# Patient Record
Sex: FEMALE | Race: WHITE | NOT HISPANIC OR LATINO | Employment: UNEMPLOYED | URBAN - NONMETROPOLITAN AREA
[De-identification: names, ages, dates, MRNs, and addresses within clinical notes are randomized per-mention and may not be internally consistent; named-entity substitution may affect disease eponyms.]

---

## 2022-01-18 ENCOUNTER — OFFICE VISIT (OUTPATIENT)
Dept: OBSTETRICS AND GYNECOLOGY | Facility: CLINIC | Age: 32
End: 2022-01-18

## 2022-01-18 ENCOUNTER — LAB (OUTPATIENT)
Dept: LAB | Facility: HOSPITAL | Age: 32
End: 2022-01-18

## 2022-01-18 VITALS — WEIGHT: 215.6 LBS | SYSTOLIC BLOOD PRESSURE: 126 MMHG | DIASTOLIC BLOOD PRESSURE: 72 MMHG

## 2022-01-18 DIAGNOSIS — E28.2 PCOS (POLYCYSTIC OVARIAN SYNDROME): ICD-10-CM

## 2022-01-18 DIAGNOSIS — N93.9 ABNORMAL UTERINE BLEEDING (AUB): ICD-10-CM

## 2022-01-18 DIAGNOSIS — N93.9 ABNORMAL UTERINE BLEEDING (AUB): Primary | ICD-10-CM

## 2022-01-18 PROCEDURE — 83036 HEMOGLOBIN GLYCOSYLATED A1C: CPT

## 2022-01-18 PROCEDURE — 84403 ASSAY OF TOTAL TESTOSTERONE: CPT | Performed by: OBSTETRICS & GYNECOLOGY

## 2022-01-18 PROCEDURE — 84480 ASSAY TRIIODOTHYRONINE (T3): CPT

## 2022-01-18 PROCEDURE — 82627 DEHYDROEPIANDROSTERONE: CPT

## 2022-01-18 PROCEDURE — 86800 THYROGLOBULIN ANTIBODY: CPT

## 2022-01-18 PROCEDURE — 36415 COLL VENOUS BLD VENIPUNCTURE: CPT

## 2022-01-18 PROCEDURE — 84146 ASSAY OF PROLACTIN: CPT

## 2022-01-18 PROCEDURE — 84443 ASSAY THYROID STIM HORMONE: CPT | Performed by: OBSTETRICS & GYNECOLOGY

## 2022-01-18 PROCEDURE — 83525 ASSAY OF INSULIN: CPT

## 2022-01-18 PROCEDURE — 86376 MICROSOMAL ANTIBODY EACH: CPT

## 2022-01-18 PROCEDURE — 84402 ASSAY OF FREE TESTOSTERONE: CPT | Performed by: OBSTETRICS & GYNECOLOGY

## 2022-01-18 PROCEDURE — 84439 ASSAY OF FREE THYROXINE: CPT | Performed by: OBSTETRICS & GYNECOLOGY

## 2022-01-18 PROCEDURE — 85027 COMPLETE CBC AUTOMATED: CPT

## 2022-01-18 PROCEDURE — 99204 OFFICE O/P NEW MOD 45 MIN: CPT | Performed by: OBSTETRICS & GYNECOLOGY

## 2022-01-18 RX ORDER — SERTRALINE HYDROCHLORIDE 100 MG/1
100 TABLET, FILM COATED ORAL DAILY
COMMUNITY
End: 2022-10-18

## 2022-01-19 LAB
DEPRECATED RDW RBC AUTO: 41.7 FL (ref 37–54)
ERYTHROCYTE [DISTWIDTH] IN BLOOD BY AUTOMATED COUNT: 13.1 % (ref 12.3–15.4)
HBA1C MFR BLD: 5.48 % (ref 4.8–5.6)
HCT VFR BLD AUTO: 41.3 % (ref 34–46.6)
HGB BLD-MCNC: 14 G/DL (ref 12–15.9)
MCH RBC QN AUTO: 29.6 PG (ref 26.6–33)
MCHC RBC AUTO-ENTMCNC: 33.9 G/DL (ref 31.5–35.7)
MCV RBC AUTO: 87.3 FL (ref 79–97)
PLATELET # BLD AUTO: 338 10*3/MM3 (ref 140–450)
PMV BLD AUTO: 9.9 FL (ref 6–12)
PROLACTIN SERPL-MCNC: 4.72 NG/ML (ref 4.79–23.3)
RBC # BLD AUTO: 4.73 10*6/MM3 (ref 3.77–5.28)
T3 SERPL-MCNC: 122 NG/DL (ref 80–200)
T4 FREE SERPL-MCNC: 1 NG/DL (ref 0.93–1.7)
TSH SERPL DL<=0.05 MIU/L-ACNC: 0.47 UIU/ML (ref 0.27–4.2)
WBC NRBC COR # BLD: 8.34 10*3/MM3 (ref 3.4–10.8)

## 2022-01-19 NOTE — PROGRESS NOTES
"[facility]  Gynecology  Date of Service: 2022    CC: \"I have PCOS and I want a hysterectomy\"    SEYMOUR Joyce is a 31 y.o.  premenopausal female who presents with complaints of PCOS and \"to discuss the next steps for a hysterectomy.\"    She states that she has a longstanding history of PCOS, irregular periods, and elevated testosterone.  She states that she has been on metformin but not currently.  She is currently taking OTC ashwagandha supplements.  She states that she has not had a period for 6 months.  Prior to that, she had irregular periods.  She states that she has had several ultrasounds and was told that \"her PCOS wasn't as bad on the repeat ultrasound.\"  She states that her doctor in [place] \"used her as a guinea pig\" with several medications to help her achieve pregnancy and lose weight, which was successful (she did achieve 2 pregnancies).      She does also describe postcoital bleeding which occurred after having intercourse on Friday.  She feels that it is slowing down but \"her vagina is raw from how many times that she had to remove her menstrual cup.\"    She is very concerned about her hormones and is involved with several groups on social media regarding her hormones and PCOS.  She is tearful and states that this part is affecting her life.  She reports hair loss, facial hair, mood swings, and feeling overwhelmed.  She states that she has had a long discussion with her  and they have decided that they are done having kids and want for her to have a hysterectomy.    ROS  Review of Systems   Constitutional: Negative.    Eyes: Negative.    Respiratory: Negative.    Cardiovascular: Negative.    Gastrointestinal: Positive for abdominal pain and nausea.   Endocrine: Positive for heat intolerance.   Genitourinary: Positive for dyspareunia, frequency, menstrual problem and vaginal bleeding.   Musculoskeletal: Negative.    Skin: Negative.  "   Allergic/Immunologic: Negative.    Neurological: Positive for headaches.   Hematological: Negative.    Psychiatric/Behavioral: Positive for dysphoric mood and sleep disturbance. The patient is nervous/anxious.      GYN HISTORY  Menarche: age 14  Menses: Irregular  History of STIs: None  Last pap smear: , [place] per patient (but no result seen)  Abnormal pap smear history: None per patient  Contraception: None     OB HISTORY  OB History    Para Term  AB Living   2 2 2     2   SAB IAB Ectopic Molar Multiple Live Births             2      # Outcome Date GA Lbr Mauricio/2nd Weight Sex Delivery Anes PTL Lv   2 Term 20 39w1d  2722 g (6 lb) F Vag-Spont EPI  IRLANDA   1 Term 2016 37w0d  2183 g (4 lb 13 oz) M Vag-Spont EPI  IRLANDA     PAST MEDICAL HISTORY  Past Medical History:   Diagnosis Date   • Anxiety    • Migraine    • Mild tetrahydrocannabinol (THC) abuse     uses CBD, hemp   • Polycystic ovary syndrome    • Tobacco abuse     electronic cigarette     PAST SURGICAL HISTORY  Past Surgical History:   Procedure Laterality Date   • LAPAROSCOPIC CHOLECYSTECTOMY       FAMILY HISTORY  Family History   Problem Relation Age of Onset   • No Known Problems Father    • No Known Problems Mother    • Colon cancer Neg Hx    • Uterine cancer Neg Hx    • Breast cancer Neg Hx    • Ovarian cancer Neg Hx      SOCIAL HISTORY  Social History     Socioeconomic History   • Marital status:    Tobacco Use   • Smoking status: Current Every Day Smoker     Packs/day: 0.00     Years: 1.00     Pack years: 0.00     Types: Electronic Cigarette   • Smokeless tobacco: Never Used   Vaping Use   • Vaping Use: Every day   • Substances: CBD   Substance and Sexual Activity   • Alcohol use: Never   • Drug use: Never     Types: Other   • Sexual activity: Yes     Partners: Male     Birth control/protection: None     Comment: My  of 7 years     ALLERGIES  No Known Allergies    HOME MEDICATIONS  Prior to Admission  medications    Medication Sig Start Date End Date Taking? Authorizing Provider   ASHWAGANDHA PO Take  by mouth.   Yes ProviderKenny MD   sertraline (ZOLOFT) 100 MG tablet Take 100 mg by mouth Daily.   Yes Provider, MD Kenny     PE  /72   Wt 97.8 kg (215 lb 9.6 oz)        General: Alert, healthy, no distress, well nourished and well developed.  Neurologic: Alert, oriented to person, place, and time.  Gait normal.  Cranial nerves II-XII grossly intact.  HEENT: Normocephalic, atraumatic.  Extraocular muscles intact, pupils equal and reactive times two.    Neck: Supple, no adenopathy, thyroid normal size, non-tender, without nodularity, trachea midline.  Lungs: Normal respiratory effort.  Clear to auscultation bilaterally.  No wheezes, rhonci, or rales.  Heart: Regular rate and rhythm.  No murmer, rub or gallop.  Abdomen: Soft, obese, non-tender, non-distended,no masses, no hepatosplenomegaly, no hernia.  Skin: No rash, no lesions.  Extremities: No cyanosis, clubbing or edema.  PELVIC EXAM: Deferred to EMB/pap smear    IMPRESSION  Daphne Joyce is a 31 y.o.  presenting with AUB, suspect AUB-O, with PCOS.    PLAN    1. Abnormal uterine bleeding (AUB)  Will obtain labs and RTC for US/EMB.  Following these results, will discuss if hysterectomy is appropriate next step.  - CBC (No Diff); Future  - TSH+Free T4  - T3; Future  - US Non-ob Transvaginal; Future    2. PCOS (polycystic ovarian syndrome)  I discussed that a hysterectomy will not resolve the underlying issue of PCOS which is not ovarian cysts but metabolic syndrome.  I discussed that this ultimately involves improving insulin resistance, weight loss, exercise, and controlling co-morbidities.  Will evaluate hormones.  - Thyroid Antibodies; Future  - Insulin, Total; Future  - DHEA-Sulfate; Future  - Testosterone, Free, Total  - Hemoglobin A1c; Future    This document has been electronically signed by Hillary Nation MD on 2022  21:50 CST.

## 2022-01-20 LAB
DHEA-S SERPL-MCNC: 186 UG/DL (ref 84.8–378)
INSULIN SERPL-ACNC: 12.9 UIU/ML (ref 2.6–24.9)
THYROGLOB AB SERPL-ACNC: <1 IU/ML (ref 0–0.9)
THYROPEROXIDASE AB SERPL-ACNC: 12 IU/ML (ref 0–34)

## 2022-01-22 LAB
TESTOST FREE SERPL-MCNC: 2.7 PG/ML (ref 0–4.2)
TESTOST SERPL-MCNC: 42 NG/DL (ref 13–71)

## 2022-10-18 ENCOUNTER — OFFICE VISIT (OUTPATIENT)
Dept: FAMILY MEDICINE CLINIC | Facility: CLINIC | Age: 32
End: 2022-10-18

## 2022-10-18 VITALS
SYSTOLIC BLOOD PRESSURE: 124 MMHG | HEART RATE: 118 BPM | WEIGHT: 217.4 LBS | RESPIRATION RATE: 22 BRPM | HEIGHT: 63 IN | BODY MASS INDEX: 38.52 KG/M2 | DIASTOLIC BLOOD PRESSURE: 68 MMHG | OXYGEN SATURATION: 98 %

## 2022-10-18 DIAGNOSIS — F41.9 ANXIETY: ICD-10-CM

## 2022-10-18 DIAGNOSIS — Z76.89 ENCOUNTER TO ESTABLISH CARE: Primary | ICD-10-CM

## 2022-10-18 PROCEDURE — 99204 OFFICE O/P NEW MOD 45 MIN: CPT | Performed by: NURSE PRACTITIONER

## 2022-10-18 NOTE — PROGRESS NOTES
"Chief Complaint  Establish Care    Subjective          Daphne Joyce presents to [facility] [place]  To establish care. She is having issues with anxiety and would like to get started on something.       Anxiety  Presents for initial visit. Onset was at an unknown time. The problem has been waxing and waning. Symptoms include excessive worry and nervous/anxious behavior. Symptoms occur occasionally. The severity of symptoms is mild.     Her past medical history is significant for anxiety/panic attacks. Past treatments include SSRIs and non-SSRI antidepressants. The treatment provided moderate relief. Compliance with prior treatments has been good.     Outpatient Medications Prior to Visit   Medication Sig Dispense Refill   • ASHWAGANDHA PO Take  by mouth.     • sertraline (ZOLOFT) 100 MG tablet Take 100 mg by mouth Daily.       No facility-administered medications prior to visit.       Review of Systems   Psychiatric/Behavioral: The patient is nervous/anxious.          Objective   Vital Signs:   Visit Vitals  /68 (BP Location: Left arm, Patient Position: Sitting, Cuff Size: Large Adult)   Pulse 118   Resp 22   Ht 160 cm (63\")   Wt 98.6 kg (217 lb 6.4 oz)   SpO2 98%   BMI 38.51 kg/m²     Physical Exam  Vitals and nursing note reviewed.   Constitutional:       Appearance: She is well-developed.   HENT:      Head: Normocephalic and atraumatic.   Eyes:      General: Lids are normal.      Conjunctiva/sclera: Conjunctivae normal.   Neck:      Thyroid: No thyroid mass or thyromegaly.      Trachea: Trachea normal. No tracheal tenderness.   Cardiovascular:      Rate and Rhythm: Normal rate.      Pulses: Normal pulses.      Heart sounds: Normal heart sounds.   Pulmonary:      Effort: Pulmonary effort is normal. No respiratory distress.      Breath sounds: Normal breath sounds. No wheezing.   Abdominal:      General: There is no distension.      Palpations: Abdomen is " soft. There is no mass.   Musculoskeletal:         General: Normal range of motion.      Cervical back: Normal range of motion. No edema.   Skin:     General: Skin is warm and dry.      Coloration: Skin is not pale.      Findings: No abrasion, erythema or lesion.   Neurological:      Mental Status: She is alert and oriented to person, place, and time.   Psychiatric:         Mood and Affect: Mood is anxious. Affect is not inappropriate.         Speech: Speech normal.         Behavior: Behavior normal.         Thought Content: Thought content normal.         Judgment: Judgment normal. Judgment is not impulsive.        Result Review :                 Assessment and Plan    Diagnoses and all orders for this visit:    1. Encounter to establish care (Primary)    2. Anxiety  -     sertraline (Zoloft) 50 MG tablet; Take 1 tablet by mouth Daily.  Dispense: 30 tablet; Refill: 11  -     TSH; Future  -     Comprehensive Metabolic Panel; Future  -     Hemoglobin A1c; Future  -     CBC & Differential; Future  -     Vitamin B12; Future  -     Lipid Panel; Future        Please complete ordered lab work   We will call with results    We will start Zoloft   Please call the office if you have any issues    If you have any thoughts of harming yourself or others please stop medication and go to ER         Follow Up   Return in about 4 weeks (around 11/15/2022), or if symptoms worsen or fail to improve, for Recheck.  Patient was given instructions and counseling regarding her condition or for health maintenance advice. Please see specific information pulled into the AVS if appropriate.           This document has been electronically signed by MANUEL Crawford on October 19, 2022 16:59 CDT

## 2023-01-17 ENCOUNTER — OFFICE VISIT (OUTPATIENT)
Dept: FAMILY MEDICINE CLINIC | Facility: CLINIC | Age: 33
End: 2023-01-17
Payer: COMMERCIAL

## 2023-01-17 DIAGNOSIS — M54.50 ACUTE BILATERAL LOW BACK PAIN WITHOUT SCIATICA: Primary | ICD-10-CM

## 2023-01-17 DIAGNOSIS — F41.9 ANXIETY: ICD-10-CM

## 2023-01-17 PROCEDURE — 99214 OFFICE O/P EST MOD 30 MIN: CPT | Performed by: NURSE PRACTITIONER

## 2023-01-17 RX ORDER — SERTRALINE HYDROCHLORIDE 100 MG/1
100 TABLET, FILM COATED ORAL DAILY
Qty: 30 TABLET | Refills: 11 | Status: SHIPPED | OUTPATIENT
Start: 2023-01-17

## 2023-01-17 RX ORDER — CYCLOBENZAPRINE HCL 10 MG
10 TABLET ORAL 3 TIMES DAILY PRN
Qty: 60 TABLET | Refills: 0 | Status: SHIPPED | OUTPATIENT
Start: 2023-01-17

## 2023-01-17 NOTE — PROGRESS NOTES
"Chief Complaint  Med Refill and Back Pain    Subjective          Daphne Joyce presents to [facility] [place] with complaints of lower back pain that has been going on for about a month. Anxiety has been getting a little worse and would like to increase her zoloft dose.         Back Pain  This is a new problem. The current episode started 1 to 4 weeks ago. The problem occurs daily. The problem is unchanged. The quality of the pain is described as aching. The pain is at a severity of 4/10. The pain is mild. She has tried heat and bed rest for the symptoms. The treatment provided mild relief.   Anxiety  Presents for follow-up visit. Onset was at an unknown time. The problem has been waxing and waning. Symptoms include excessive worry and nervous/anxious behavior. Symptoms occur occasionally. The severity of symptoms is mild.     Her past medical history is significant for anxiety/panic attacks. Past treatments include SSRIs and non-SSRI antidepressants. The treatment provided moderate relief. Compliance with prior treatments has been good.     Outpatient Medications Prior to Visit   Medication Sig Dispense Refill   • sertraline (Zoloft) 50 MG tablet Take 1 tablet by mouth Daily. 30 tablet 11   • ASHWAGANDHA PO Take  by mouth.       No facility-administered medications prior to visit.       Review of Systems   Musculoskeletal: Positive for back pain.   Psychiatric/Behavioral: The patient is nervous/anxious.          Objective   Vital Signs:   Visit Vitals  /88   Pulse 82   Temp 97.6 °F (36.4 °C) (Infrared)   Resp 18   Ht 160 cm (63\")   Wt 28.6 kg (63 lb)   SpO2 98%   BMI 11.16 kg/m²     Physical Exam  Vitals and nursing note reviewed.   Constitutional:       Appearance: She is well-developed.   HENT:      Head: Normocephalic and atraumatic.   Eyes:      General: Lids are normal.      Conjunctiva/sclera: Conjunctivae normal.   Neck:      Thyroid: No thyroid mass or " thyromegaly.      Trachea: Trachea normal. No tracheal tenderness.   Cardiovascular:      Rate and Rhythm: Normal rate.      Pulses: Normal pulses.      Heart sounds: Normal heart sounds.   Pulmonary:      Effort: Pulmonary effort is normal. No respiratory distress.      Breath sounds: Normal breath sounds. No wheezing.   Abdominal:      General: There is no distension.      Palpations: Abdomen is soft. There is no mass.   Musculoskeletal:         General: Normal range of motion.      Cervical back: Normal range of motion. No edema.      Lumbar back: Spasms and tenderness present.   Skin:     General: Skin is warm and dry.      Coloration: Skin is not pale.      Findings: No abrasion, erythema or lesion.   Neurological:      Mental Status: She is alert and oriented to person, place, and time.   Psychiatric:         Mood and Affect: Mood is not anxious. Affect is not inappropriate.         Speech: Speech normal.         Behavior: Behavior normal.         Thought Content: Thought content normal.         Judgment: Judgment normal. Judgment is not impulsive.        Result Review :                 Assessment and Plan    Diagnoses and all orders for this visit:    1. Acute bilateral low back pain without sciatica (Primary)  -     cyclobenzaprine (FLEXERIL) 10 MG tablet; Take 1 tablet by mouth 3 (Three) Times a Day As Needed for Muscle Spasms.  Dispense: 60 tablet; Refill: 0  -     XR Spine Lumbar 2 or 3 View; Future    2. Anxiety  -     sertraline (Zoloft) 100 MG tablet; Take 1 tablet by mouth Daily.  Dispense: 30 tablet; Refill: 11        X-ray of lower back today  We will call with results  Start prescribed medications May use 3 times a day  May use heat on lower back to help with spasms    Start Zoloft 100 mg daily  If you have any thoughts of harming yourself or others on this medication please stop and go to the ER    Please call the office if you have any issues        Follow Up   Return if symptoms worsen or fail  to improve, for Next scheduled follow up.  Patient was given instructions and counseling regarding her condition or for health maintenance advice. Please see specific information pulled into the AVS if appropriate.           This document has been electronically signed by MANUEL Crawford on January 19, 2023 07:19 CST  Answers for HPI/ROS submitted by the patient on 1/17/2023  Please describe your symptoms.: Aching lower back pain. Also would like to discuss increasing my anxiety medication.  Have you had these symptoms before?: Yes  How long have you been having these symptoms?: Greater than 2 weeks  What is the primary reason for your visit?: Other

## 2023-06-06 ENCOUNTER — INITIAL PRENATAL (OUTPATIENT)
Dept: OBSTETRICS AND GYNECOLOGY | Facility: CLINIC | Age: 33
End: 2023-06-06
Payer: COMMERCIAL

## 2023-06-06 ENCOUNTER — LAB (OUTPATIENT)
Dept: LAB | Facility: HOSPITAL | Age: 33
End: 2023-06-06
Payer: COMMERCIAL

## 2023-06-06 VITALS — WEIGHT: 201 LBS | SYSTOLIC BLOOD PRESSURE: 102 MMHG | DIASTOLIC BLOOD PRESSURE: 62 MMHG | BODY MASS INDEX: 35.61 KG/M2

## 2023-06-06 DIAGNOSIS — Z32.01 PREGNANCY EXAMINATION OR TEST, POSITIVE RESULT: Primary | ICD-10-CM

## 2023-06-06 DIAGNOSIS — Z32.00 PREGNANCY EXAMINATION OR TEST, PREGNANCY UNCONFIRMED: ICD-10-CM

## 2023-06-06 PROBLEM — F12.10 MILD TETRAHYDROCANNABINOL (THC) ABUSE: Status: ACTIVE | Noted: 2023-06-06

## 2023-06-06 LAB
ABO GROUP BLD: NORMAL
AMPHET+METHAMPHET UR QL: NEGATIVE
AMPHETAMINES UR QL: NEGATIVE
B-HCG UR QL: POSITIVE
BARBITURATES UR QL SCN: NEGATIVE
BASOPHILS # BLD AUTO: 0.04 10*3/MM3 (ref 0–0.2)
BASOPHILS NFR BLD AUTO: 0.4 % (ref 0–1.5)
BENZODIAZ UR QL SCN: NEGATIVE
BILIRUB UR QL STRIP: NEGATIVE
BLD GP AB SCN SERPL QL: NEGATIVE
BUPRENORPHINE SERPL-MCNC: NEGATIVE NG/ML
CANNABINOIDS SERPL QL: POSITIVE
CLARITY UR: ABNORMAL
COCAINE UR QL: NEGATIVE
COLOR UR: YELLOW
DEPRECATED RDW RBC AUTO: 41.1 FL (ref 37–54)
EOSINOPHIL # BLD AUTO: 0.15 10*3/MM3 (ref 0–0.4)
EOSINOPHIL NFR BLD AUTO: 1.5 % (ref 0.3–6.2)
ERYTHROCYTE [DISTWIDTH] IN BLOOD BY AUTOMATED COUNT: 13 % (ref 12.3–15.4)
EXPIRATION DATE: ABNORMAL
FENTANYL UR-MCNC: NEGATIVE NG/ML
GLUCOSE UR STRIP-MCNC: NEGATIVE MG/DL
HBV SURFACE AG SERPL QL IA: NORMAL
HCT VFR BLD AUTO: 42.9 % (ref 34–46.6)
HCV AB SER DONR QL: NORMAL
HGB BLD-MCNC: 14.5 G/DL (ref 12–15.9)
HGB UR QL STRIP.AUTO: NEGATIVE
HIV1+2 AB SER QL: NORMAL
IMM GRANULOCYTES # BLD AUTO: 0.03 10*3/MM3 (ref 0–0.05)
IMM GRANULOCYTES NFR BLD AUTO: 0.3 % (ref 0–0.5)
INTERNAL NEGATIVE CONTROL: NEGATIVE
INTERNAL POSITIVE CONTROL: POSITIVE
KETONES UR QL STRIP: ABNORMAL
LEUKOCYTE ESTERASE UR QL STRIP.AUTO: ABNORMAL
LYMPHOCYTES # BLD AUTO: 2.05 10*3/MM3 (ref 0.7–3.1)
LYMPHOCYTES NFR BLD AUTO: 19.9 % (ref 19.6–45.3)
Lab: ABNORMAL
Lab: NORMAL
MCH RBC QN AUTO: 29.4 PG (ref 26.6–33)
MCHC RBC AUTO-ENTMCNC: 33.8 G/DL (ref 31.5–35.7)
MCV RBC AUTO: 87 FL (ref 79–97)
METHADONE UR QL SCN: NEGATIVE
MONOCYTES # BLD AUTO: 0.63 10*3/MM3 (ref 0.1–0.9)
MONOCYTES NFR BLD AUTO: 6.1 % (ref 5–12)
NEUTROPHILS NFR BLD AUTO: 7.41 10*3/MM3 (ref 1.7–7)
NEUTROPHILS NFR BLD AUTO: 71.8 % (ref 42.7–76)
NITRITE UR QL STRIP: NEGATIVE
NRBC BLD AUTO-RTO: 0 /100 WBC (ref 0–0.2)
OPIATES UR QL: NEGATIVE
OXYCODONE UR QL SCN: NEGATIVE
PCP UR QL SCN: NEGATIVE
PH UR STRIP.AUTO: 5.5 [PH] (ref 5–8)
PLATELET # BLD AUTO: 305 10*3/MM3 (ref 140–450)
PMV BLD AUTO: 9.9 FL (ref 6–12)
PROPOXYPH UR QL: NEGATIVE
PROT UR QL STRIP: ABNORMAL
RBC # BLD AUTO: 4.93 10*6/MM3 (ref 3.77–5.28)
RH BLD: POSITIVE
SP GR UR STRIP: 1.02 (ref 1–1.03)
TRICYCLICS UR QL SCN: NEGATIVE
UROBILINOGEN UR QL STRIP: ABNORMAL
WBC NRBC COR # BLD: 10.31 10*3/MM3 (ref 3.4–10.8)

## 2023-06-06 PROCEDURE — 87661 TRICHOMONAS VAGINALIS AMPLIF: CPT | Performed by: NURSE PRACTITIONER

## 2023-06-06 PROCEDURE — 80081 OBSTETRIC PANEL INC HIV TSTG: CPT | Performed by: NURSE PRACTITIONER

## 2023-06-06 PROCEDURE — 80307 DRUG TEST PRSMV CHEM ANLYZR: CPT | Performed by: NURSE PRACTITIONER

## 2023-06-06 PROCEDURE — 86803 HEPATITIS C AB TEST: CPT | Performed by: NURSE PRACTITIONER

## 2023-06-06 PROCEDURE — 87591 N.GONORRHOEAE DNA AMP PROB: CPT | Performed by: NURSE PRACTITIONER

## 2023-06-06 PROCEDURE — 87086 URINE CULTURE/COLONY COUNT: CPT | Performed by: NURSE PRACTITIONER

## 2023-06-06 PROCEDURE — 36415 COLL VENOUS BLD VENIPUNCTURE: CPT

## 2023-06-06 PROCEDURE — 87491 CHLMYD TRACH DNA AMP PROBE: CPT | Performed by: NURSE PRACTITIONER

## 2023-06-06 PROCEDURE — 81003 URINALYSIS AUTO W/O SCOPE: CPT | Performed by: NURSE PRACTITIONER

## 2023-06-06 PROCEDURE — 81025 URINE PREGNANCY TEST: CPT | Performed by: NURSE PRACTITIONER

## 2023-06-06 NOTE — PROGRESS NOTES
I spent approximately 60 minutes with the patient acquiring the health and history intake, reviewing prior records, discussing topics related to healthy pregnancy, entering orders, and documentation. Her LMP is unknown. Patient states that her LMP was sometime in April. This is her 3rd pregnancy.   A new OB bag is given. The 1st trimester teaching was done with the patient. We discussed a healthy diet and exercise and what is recommended. I also discussed Listeriosis and Toxoplasmosis and what fish to avoid due to high mercury levels. I informed patient not to be in hot tubs, saunas, or tanning beds. We discussed that spotting may occur after intercourse which is common, but if heavy bleeding like a period occurs to [facility] or hospital if clinic is closed.  I encouraged her to make an appointment with the dentist if she has not had a dental exam and cleaning in the last 6 months.  I instructed the patient that alcohol, illicit drug use, and tobacco smoking are not recommended in pregnancy.  She plans to breastfeed and bottle feed. Patient states that she didn't produce enough milk to only breastfeed with the last baby.  She filled out the health department referral form and depression screening questionnaire. I encouraged the patient to get the TDAP vaccine in the 3rd trimester.  I discussed with the patient that a pediatrician needs to be chosen prior to delivery for the infant to have an appointment scheduled before leaving the hospital. During the patient first pregnancy, she had an abnormal trisomy 18 test result and intermittent absent doppler flow.  The baby had no issues at birth.  During the patients second pregnancy, she had an abnormal trisomy 21 test result.  No issues at birth.  I discussed lab tests will be done today. All questions were answered at this time.  The patient filled out the papers to get her medical records from her first pregnancy.

## 2023-06-07 LAB
BACTERIA SPEC AEROBE CULT: NO GROWTH
C TRACH RRNA CVX QL NAA+PROBE: NEGATIVE
N GONORRHOEA RRNA SPEC QL NAA+PROBE: NEGATIVE
RPR SER QL: NORMAL
RUBV IGG SERPL IA-ACNC: 5.91 INDEX
TRICHOMONAS VAGINALIS PCR: NEGATIVE

## 2023-07-26 ENCOUNTER — ROUTINE PRENATAL (OUTPATIENT)
Dept: OBSTETRICS AND GYNECOLOGY | Facility: CLINIC | Age: 33
End: 2023-07-26
Payer: COMMERCIAL

## 2023-07-26 VITALS — DIASTOLIC BLOOD PRESSURE: 62 MMHG | WEIGHT: 197.8 LBS | SYSTOLIC BLOOD PRESSURE: 118 MMHG | BODY MASS INDEX: 35.04 KG/M2

## 2023-07-26 DIAGNOSIS — O09.292 HISTORY OF INTRAUTERINE GROWTH RESTRICTION IN PRIOR PREGNANCY, CURRENTLY PREGNANT IN SECOND TRIMESTER: ICD-10-CM

## 2023-07-26 DIAGNOSIS — F12.10 MILD TETRAHYDROCANNABINOL (THC) ABUSE: Primary | ICD-10-CM

## 2023-07-26 DIAGNOSIS — O99.210 OBESITY IN PREGNANCY, ANTEPARTUM: ICD-10-CM

## 2023-07-26 DIAGNOSIS — O09.92 HIGH-RISK PREGNANCY IN SECOND TRIMESTER: ICD-10-CM

## 2023-07-26 NOTE — PROGRESS NOTES
CC: Prenatal visit    Daphne Joyce is a 32 y.o.  at 14w1d.  Doing well.  Denies contractions, LOF, or VB.  Not yet feeling regular FM, possible flutters.    BM 1x per week, constipated.  Occasional muscle cramps    /62   Wt 89.7 kg (197 lb 12.8 oz)   LMP  (LMP Unknown)   BMI 35.04 kg/mý   SVE: deferred     Fetal Heart Rate: +vscan      A/P: Daphne Joyce is a 32 y.o.  at 14w1d.  - RTC in 4 weeks  - Discussed increased po hydration, considering magnesium oxide for cramps and constipation, other OTC tx constipation  - Reviewed COVID-19 visitation policy  - Reviewed COVID-19 precautions    Problem List Items Addressed This Visit          Gravid and     History of intrauterine growth restriction in prior pregnancy, currently pregnant in second trimester    Overview     Consider serial growth 3T         High-risk pregnancy in second trimester    Overview     Neg carrier screen  Low risk male         Obesity in pregnancy, antepartum    Overview     Early 1 hr GCT recommended            Mental Health    Mild tetrahydrocannabinol (THC) abuse - Primary    Overview     +THC at nob Ed visit. Not enough urine for cannabinoid confirmation test. Needs urine specimen and this test ordered at first visit with provider.              Diagnosis Plan   1. Mild tetrahydrocannabinol (THC) abuse        2. History of intrauterine growth restriction in prior pregnancy, currently pregnant in second trimester        3. High-risk pregnancy in second trimester        4. Obesity in pregnancy, antepartum          Alecia Toscano DO  2023  15:26 CDT

## 2023-08-08 ENCOUNTER — REFERRAL TRIAGE (OUTPATIENT)
Dept: OBSTETRICS AND GYNECOLOGY | Facility: HOSPITAL | Age: 33
End: 2023-08-08
Payer: COMMERCIAL

## 2023-08-14 ENCOUNTER — PATIENT OUTREACH (OUTPATIENT)
Dept: LABOR AND DELIVERY | Facility: HOSPITAL | Age: 33
End: 2023-08-14
Payer: COMMERCIAL

## 2023-08-14 NOTE — OUTREACH NOTE
[facility]  Enrollment    Current Estimated Gestational Age: 16w6d    Questions/Answers    Flowsheet Row Responses   Would like to participate? No          Patient not interested in program.     Tea Kendall RN  Maternity Nurse Navigator    8/14/2023, 13:58 CDT

## 2023-08-20 PROBLEM — O99.210 OBESITY IN PREGNANCY, ANTEPARTUM: Status: ACTIVE | Noted: 2023-08-20

## 2023-08-20 PROBLEM — O09.92 HIGH-RISK PREGNANCY IN SECOND TRIMESTER: Status: ACTIVE | Noted: 2023-08-20

## 2023-08-20 PROBLEM — O09.292 HISTORY OF INTRAUTERINE GROWTH RESTRICTION IN PRIOR PREGNANCY, CURRENTLY PREGNANT IN SECOND TRIMESTER: Status: ACTIVE | Noted: 2023-08-20

## 2023-08-23 ENCOUNTER — ROUTINE PRENATAL (OUTPATIENT)
Dept: OBSTETRICS AND GYNECOLOGY | Facility: CLINIC | Age: 33
End: 2023-08-23
Payer: COMMERCIAL

## 2023-08-23 ENCOUNTER — LAB (OUTPATIENT)
Dept: LAB | Facility: HOSPITAL | Age: 33
End: 2023-08-23
Payer: COMMERCIAL

## 2023-08-23 VITALS — BODY MASS INDEX: 36.14 KG/M2 | WEIGHT: 204 LBS | DIASTOLIC BLOOD PRESSURE: 64 MMHG | SYSTOLIC BLOOD PRESSURE: 110 MMHG

## 2023-08-23 DIAGNOSIS — O99.210 OBESITY IN PREGNANCY, ANTEPARTUM: ICD-10-CM

## 2023-08-23 DIAGNOSIS — O09.92 HIGH-RISK PREGNANCY IN SECOND TRIMESTER: ICD-10-CM

## 2023-08-23 DIAGNOSIS — F12.10 MILD TETRAHYDROCANNABINOL (THC) ABUSE: ICD-10-CM

## 2023-08-23 DIAGNOSIS — O09.292 HISTORY OF INTRAUTERINE GROWTH RESTRICTION IN PRIOR PREGNANCY, CURRENTLY PREGNANT IN SECOND TRIMESTER: ICD-10-CM

## 2023-08-23 DIAGNOSIS — Z36.3 ENCOUNTER FOR ROUTINE SCREENING FOR FETAL MALFORMATION USING ULTRASOUND: ICD-10-CM

## 2023-08-23 DIAGNOSIS — Z3A.18 18 WEEKS GESTATION OF PREGNANCY: Primary | ICD-10-CM

## 2023-08-23 DIAGNOSIS — O99.211 MATERNAL OBESITY WITHOUT HYPERTENSION, IN FIRST TRIMESTER: ICD-10-CM

## 2023-08-23 LAB — GLUCOSE 1H P 100 G GLC PO SERPL-MCNC: 123 MG/DL (ref 65–139)

## 2023-08-23 PROCEDURE — 99213 OFFICE O/P EST LOW 20 MIN: CPT

## 2023-08-23 PROCEDURE — 82950 GLUCOSE TEST: CPT

## 2023-08-23 PROCEDURE — 36415 COLL VENOUS BLD VENIPUNCTURE: CPT

## 2023-08-23 NOTE — PROGRESS NOTES
CC: Prenatal visit    Daphne Joyce is a 32 y.o.  at 18w1d.  Doing well.  Denies contractions, LOF, or VB.  Reports good FM. Reports having some clear vaginal discharge. Denies any itching, burning or odor.     /64   Wt 92.5 kg (204 lb)   LMP  (LMP Unknown)   BMI 36.14 kg/mý   SVE: na     Fetal Heart Rate: 156    2023 Problems (from 23 to present)       Problem Noted Resolved    History of intrauterine growth restriction in prior pregnancy, currently pregnant in second trimester 2023 by Alecia Toscano DO No    Overview Signed 2023  3:25 PM by Alecia Toscano DO     Consider serial growth 3T         High-risk pregnancy in second trimester 2023 by Alecia Toscano DO No    Overview Signed 2023  3:26 PM by Alecia Toscano DO     Neg carrier screen  Low risk male         Obesity in pregnancy, antepartum 2023 by Alecia Toscano DO No    Overview Signed 2023  3:26 PM by Alecia Toscano DO     Early 1 hr GCT recommended         Mild tetrahydrocannabinol (THC) abuse 2023 by Susan Esposito APRN No    Overview Signed 2023  1:21 PM by Susan Esposito APRN     +THC at nob Ed visit. Not enough urine for cannabinoid confirmation test. Needs urine specimen and this test ordered at first visit with provider.                  A/P: Daphne Joyce is a 32 y.o.  at 18w1d.  - RTC in 2 weeks with anatomy US   - Early glucola completed today.      Diagnosis Plan   1. 18 weeks gestation of pregnancy        2. Mild tetrahydrocannabinol (THC) abuse        3. History of intrauterine growth restriction in prior pregnancy, currently pregnant in second trimester        4. High-risk pregnancy in second trimester        5. Obesity in pregnancy, antepartum        6. Encounter for routine screening for fetal malformation using ultrasound  US Ob 14 + Weeks Single or First Gestation        MANUEL Aldridge  2023  14:01 CDT

## 2023-08-28 DIAGNOSIS — O21.9 NAUSEA AND VOMITING DURING PREGNANCY PRIOR TO 22 WEEKS GESTATION: ICD-10-CM

## 2023-08-28 RX ORDER — ONDANSETRON HYDROCHLORIDE 8 MG/1
8 TABLET, FILM COATED ORAL EVERY 12 HOURS PRN
Qty: 30 TABLET | Refills: 1 | Status: SHIPPED | OUTPATIENT
Start: 2023-08-28

## 2023-09-07 ENCOUNTER — HOSPITAL ENCOUNTER (INPATIENT)
Facility: HOSPITAL | Age: 33
LOS: 1 days | Discharge: HOME | DRG: 779 | End: 2023-09-08
Attending: OBSTETRICS & GYNECOLOGY | Admitting: OBSTETRICS & GYNECOLOGY
Payer: COMMERCIAL

## 2023-09-07 ENCOUNTER — ROUTINE PRENATAL (OUTPATIENT)
Dept: OBSTETRICS AND GYNECOLOGY | Facility: CLINIC | Age: 33
End: 2023-09-07
Payer: COMMERCIAL

## 2023-09-07 VITALS — BODY MASS INDEX: 35.96 KG/M2 | DIASTOLIC BLOOD PRESSURE: 66 MMHG | WEIGHT: 203 LBS | SYSTOLIC BLOOD PRESSURE: 104 MMHG

## 2023-09-07 DIAGNOSIS — O99.210 OBESITY IN PREGNANCY, ANTEPARTUM: ICD-10-CM

## 2023-09-07 DIAGNOSIS — O02.1 FETAL DEMISE BEFORE 20 WEEKS WITH RETENTION OF DEAD FETUS: Primary | ICD-10-CM

## 2023-09-07 DIAGNOSIS — O09.92 HIGH-RISK PREGNANCY IN SECOND TRIMESTER: ICD-10-CM

## 2023-09-07 DIAGNOSIS — F12.10 MILD TETRAHYDROCANNABINOL (THC) ABUSE: ICD-10-CM

## 2023-09-07 DIAGNOSIS — O09.292 HISTORY OF INTRAUTERINE GROWTH RESTRICTION IN PRIOR PREGNANCY, CURRENTLY PREGNANT IN SECOND TRIMESTER: ICD-10-CM

## 2023-09-07 LAB
ABO GROUP BLD: NORMAL
AMPHET+METHAMPHET UR QL: NEGATIVE
AMPHETAMINES UR QL: NEGATIVE
BARBITURATES UR QL SCN: NEGATIVE
BENZODIAZ UR QL SCN: NEGATIVE
BLD GP AB SCN SERPL QL: NEGATIVE
BUPRENORPHINE SERPL-MCNC: NEGATIVE NG/ML
CANNABINOIDS SERPL QL: POSITIVE
COCAINE UR QL: NEGATIVE
DEPRECATED RDW RBC AUTO: 44 FL (ref 37–54)
ERYTHROCYTE [DISTWIDTH] IN BLOOD BY AUTOMATED COUNT: 13.6 % (ref 12.3–15.4)
FENTANYL UR-MCNC: NEGATIVE NG/ML
HBA1C MFR BLD: 4.8 % (ref 4.8–5.6)
HCT VFR BLD AUTO: 40.8 % (ref 34–46.6)
HGB BLD-MCNC: 13.8 G/DL (ref 12–15.9)
HGB F BLD QL KLEIH BETKE: NORMAL
Lab: NORMAL
MCH RBC QN AUTO: 29.9 PG (ref 26.6–33)
MCHC RBC AUTO-ENTMCNC: 33.8 G/DL (ref 31.5–35.7)
MCV RBC AUTO: 88.3 FL (ref 79–97)
METHADONE UR QL SCN: NEGATIVE
OPIATES UR QL: NEGATIVE
OXYCODONE UR QL SCN: NEGATIVE
PCP UR QL SCN: NEGATIVE
PLATELET # BLD AUTO: 311 10*3/MM3 (ref 140–450)
PMV BLD AUTO: 9.1 FL (ref 6–12)
PROPOXYPH UR QL: NEGATIVE
RBC # BLD AUTO: 4.62 10*6/MM3 (ref 3.77–5.28)
RH BLD: POSITIVE
T&S EXPIRATION DATE: NORMAL
TRICYCLICS UR QL SCN: NEGATIVE
TSH SERPL DL<=0.05 MIU/L-ACNC: 1.13 UIU/ML (ref 0.27–4.2)
WBC NRBC COR # BLD: 13.51 10*3/MM3 (ref 3.4–10.8)

## 2023-09-07 PROCEDURE — 85597 PHOSPHOLIPID PLTLT NEUTRALIZ: CPT | Performed by: OBSTETRICS & GYNECOLOGY

## 2023-09-07 PROCEDURE — 0503F POSTPARTUM CARE VISIT: CPT | Performed by: OBSTETRICS & GYNECOLOGY

## 2023-09-07 PROCEDURE — 86901 BLOOD TYPING SEROLOGIC RH(D): CPT | Performed by: OBSTETRICS & GYNECOLOGY

## 2023-09-07 PROCEDURE — 85613 RUSSELL VIPER VENOM DILUTED: CPT | Performed by: OBSTETRICS & GYNECOLOGY

## 2023-09-07 PROCEDURE — 86850 RBC ANTIBODY SCREEN: CPT | Performed by: OBSTETRICS & GYNECOLOGY

## 2023-09-07 PROCEDURE — 85670 THROMBIN TIME PLASMA: CPT | Performed by: OBSTETRICS & GYNECOLOGY

## 2023-09-07 PROCEDURE — 59410 OBSTETRICAL CARE: CPT | Performed by: OBSTETRICS & GYNECOLOGY

## 2023-09-07 PROCEDURE — 86147 CARDIOLIPIN ANTIBODY EA IG: CPT | Performed by: OBSTETRICS & GYNECOLOGY

## 2023-09-07 PROCEDURE — 99024 POSTOP FOLLOW-UP VISIT: CPT

## 2023-09-07 PROCEDURE — 25010000002 MORPHINE PER 10 MG: Performed by: OBSTETRICS & GYNECOLOGY

## 2023-09-07 PROCEDURE — 80307 DRUG TEST PRSMV CHEM ANLYZR: CPT | Performed by: OBSTETRICS & GYNECOLOGY

## 2023-09-07 PROCEDURE — 99222 1ST HOSP IP/OBS MODERATE 55: CPT | Performed by: OBSTETRICS & GYNECOLOGY

## 2023-09-07 PROCEDURE — 85610 PROTHROMBIN TIME: CPT | Performed by: OBSTETRICS & GYNECOLOGY

## 2023-09-07 PROCEDURE — 85732 THROMBOPLASTIN TIME PARTIAL: CPT | Performed by: OBSTETRICS & GYNECOLOGY

## 2023-09-07 PROCEDURE — 85460 HEMOGLOBIN FETAL: CPT | Performed by: OBSTETRICS & GYNECOLOGY

## 2023-09-07 PROCEDURE — G0480 DRUG TEST DEF 1-7 CLASSES: HCPCS | Performed by: OBSTETRICS & GYNECOLOGY

## 2023-09-07 PROCEDURE — 86146 BETA-2 GLYCOPROTEIN ANTIBODY: CPT | Performed by: OBSTETRICS & GYNECOLOGY

## 2023-09-07 PROCEDURE — 86900 BLOOD TYPING SEROLOGIC ABO: CPT | Performed by: OBSTETRICS & GYNECOLOGY

## 2023-09-07 PROCEDURE — 83036 HEMOGLOBIN GLYCOSYLATED A1C: CPT | Performed by: OBSTETRICS & GYNECOLOGY

## 2023-09-07 PROCEDURE — 85730 THROMBOPLASTIN TIME PARTIAL: CPT | Performed by: OBSTETRICS & GYNECOLOGY

## 2023-09-07 PROCEDURE — 85598 HEXAGNAL PHOSPH PLTLT NEUTRL: CPT | Performed by: OBSTETRICS & GYNECOLOGY

## 2023-09-07 PROCEDURE — 88305 TISSUE EXAM BY PATHOLOGIST: CPT

## 2023-09-07 PROCEDURE — 85027 COMPLETE CBC AUTOMATED: CPT | Performed by: OBSTETRICS & GYNECOLOGY

## 2023-09-07 PROCEDURE — 84443 ASSAY THYROID STIM HORMONE: CPT | Performed by: OBSTETRICS & GYNECOLOGY

## 2023-09-07 RX ORDER — LIDOCAINE HYDROCHLORIDE 10 MG/ML
0.5 INJECTION, SOLUTION INFILTRATION; PERINEURAL ONCE AS NEEDED
Status: DISCONTINUED | OUTPATIENT
Start: 2023-09-07 | End: 2023-09-08 | Stop reason: HOSPADM

## 2023-09-07 RX ORDER — FERROUS SULFATE TAB EC 324 MG (65 MG FE EQUIVALENT) 324 (65 FE) MG
324 TABLET DELAYED RESPONSE ORAL 2 TIMES DAILY WITH MEALS
Status: CANCELLED | OUTPATIENT
Start: 2023-09-08

## 2023-09-07 RX ORDER — OXYTOCIN/0.9 % SODIUM CHLORIDE 30/500 ML
250 PLASTIC BAG, INJECTION (ML) INTRAVENOUS CONTINUOUS
Status: ACTIVE | OUTPATIENT
Start: 2023-09-08 | End: 2023-09-08

## 2023-09-07 RX ORDER — SODIUM CHLORIDE 0.9 % (FLUSH) 0.9 %
10 SYRINGE (ML) INJECTION EVERY 12 HOURS SCHEDULED
Status: DISCONTINUED | OUTPATIENT
Start: 2023-09-07 | End: 2023-09-08 | Stop reason: HOSPADM

## 2023-09-07 RX ORDER — IBUPROFEN 600 MG/1
600 TABLET ORAL EVERY 6 HOURS SCHEDULED
Status: CANCELLED | OUTPATIENT
Start: 2023-09-08

## 2023-09-07 RX ORDER — MORPHINE SULFATE 2 MG/ML
1 INJECTION, SOLUTION INTRAMUSCULAR; INTRAVENOUS EVERY 4 HOURS PRN
Status: DISCONTINUED | OUTPATIENT
Start: 2023-09-07 | End: 2023-09-08 | Stop reason: HOSPADM

## 2023-09-07 RX ORDER — NALOXONE HCL 0.4 MG/ML
0.4 VIAL (ML) INJECTION
Status: DISCONTINUED | OUTPATIENT
Start: 2023-09-07 | End: 2023-09-08 | Stop reason: HOSPADM

## 2023-09-07 RX ORDER — IBUPROFEN 800 MG/1
800 TABLET ORAL EVERY 8 HOURS PRN
Qty: 30 TABLET | Refills: 0 | Status: SHIPPED | OUTPATIENT
Start: 2023-09-07

## 2023-09-07 RX ORDER — PROMETHAZINE HYDROCHLORIDE 12.5 MG/1
12.5 SUPPOSITORY RECTAL EVERY 6 HOURS PRN
Status: DISCONTINUED | OUTPATIENT
Start: 2023-09-07 | End: 2023-09-08 | Stop reason: HOSPADM

## 2023-09-07 RX ORDER — IBUPROFEN 800 MG/1
800 TABLET ORAL EVERY 8 HOURS
Status: DISCONTINUED | OUTPATIENT
Start: 2023-09-08 | End: 2023-09-08 | Stop reason: HOSPADM

## 2023-09-07 RX ORDER — MISOPROSTOL 200 UG/1
400 TABLET ORAL
Status: DISCONTINUED | OUTPATIENT
Start: 2023-09-07 | End: 2023-09-08 | Stop reason: HOSPADM

## 2023-09-07 RX ORDER — ONDANSETRON 2 MG/ML
4 INJECTION INTRAMUSCULAR; INTRAVENOUS EVERY 6 HOURS PRN
Status: DISCONTINUED | OUTPATIENT
Start: 2023-09-07 | End: 2023-09-08 | Stop reason: HOSPADM

## 2023-09-07 RX ORDER — ONDANSETRON 4 MG/1
4 TABLET, FILM COATED ORAL EVERY 6 HOURS PRN
Status: CANCELLED | OUTPATIENT
Start: 2023-09-07

## 2023-09-07 RX ORDER — SODIUM CHLORIDE 0.9 % (FLUSH) 0.9 %
1-10 SYRINGE (ML) INJECTION AS NEEDED
Status: CANCELLED | OUTPATIENT
Start: 2023-09-07

## 2023-09-07 RX ORDER — DOCUSATE SODIUM 100 MG/1
100 CAPSULE, LIQUID FILLED ORAL 2 TIMES DAILY
Status: CANCELLED | OUTPATIENT
Start: 2023-09-08

## 2023-09-07 RX ORDER — ACETAMINOPHEN 500 MG
1000 TABLET ORAL EVERY 6 HOURS
Status: DISCONTINUED | OUTPATIENT
Start: 2023-09-08 | End: 2023-09-08 | Stop reason: HOSPADM

## 2023-09-07 RX ORDER — MISOPROSTOL 200 UG/1
800 TABLET ORAL ONCE
Status: COMPLETED | OUTPATIENT
Start: 2023-09-07 | End: 2023-09-07

## 2023-09-07 RX ORDER — PRENATAL VIT/IRON FUM/FOLIC AC 27MG-0.8MG
1 TABLET ORAL DAILY
Status: CANCELLED | OUTPATIENT
Start: 2023-09-08

## 2023-09-07 RX ORDER — PROMETHAZINE HYDROCHLORIDE 25 MG/1
25 TABLET ORAL EVERY 6 HOURS PRN
Status: DISCONTINUED | OUTPATIENT
Start: 2023-09-07 | End: 2023-09-08 | Stop reason: HOSPADM

## 2023-09-07 RX ORDER — CARBOPROST TROMETHAMINE 250 UG/ML
250 INJECTION, SOLUTION INTRAMUSCULAR AS NEEDED
Status: DISCONTINUED | OUTPATIENT
Start: 2023-09-07 | End: 2023-09-08 | Stop reason: HOSPADM

## 2023-09-07 RX ORDER — ACETAMINOPHEN 325 MG/1
650 TABLET ORAL EVERY 6 HOURS
Status: CANCELLED | OUTPATIENT
Start: 2023-09-08

## 2023-09-07 RX ORDER — MISOPROSTOL 200 UG/1
800 TABLET ORAL AS NEEDED
Status: DISCONTINUED | OUTPATIENT
Start: 2023-09-07 | End: 2023-09-08 | Stop reason: HOSPADM

## 2023-09-07 RX ORDER — SODIUM CHLORIDE, SODIUM LACTATE, POTASSIUM CHLORIDE, CALCIUM CHLORIDE 600; 310; 30; 20 MG/100ML; MG/100ML; MG/100ML; MG/100ML
125 INJECTION, SOLUTION INTRAVENOUS CONTINUOUS
Status: DISCONTINUED | OUTPATIENT
Start: 2023-09-07 | End: 2023-09-08 | Stop reason: HOSPADM

## 2023-09-07 RX ORDER — OXYTOCIN/0.9 % SODIUM CHLORIDE 30/500 ML
999 PLASTIC BAG, INJECTION (ML) INTRAVENOUS ONCE
Status: DISCONTINUED | OUTPATIENT
Start: 2023-09-07 | End: 2023-09-08 | Stop reason: HOSPADM

## 2023-09-07 RX ORDER — HYDROCORTISONE 25 MG/G
1 CREAM TOPICAL AS NEEDED
Status: CANCELLED | OUTPATIENT
Start: 2023-09-07

## 2023-09-07 RX ORDER — METHYLERGONOVINE MALEATE 0.2 MG/ML
200 INJECTION INTRAVENOUS ONCE AS NEEDED
Status: DISCONTINUED | OUTPATIENT
Start: 2023-09-07 | End: 2023-09-08 | Stop reason: HOSPADM

## 2023-09-07 RX ORDER — CALCIUM CARBONATE 500 MG/1
2 TABLET, CHEWABLE ORAL 3 TIMES DAILY PRN
Status: CANCELLED | OUTPATIENT
Start: 2023-09-07

## 2023-09-07 RX ORDER — MORPHINE SULFATE 2 MG/ML
2 INJECTION, SOLUTION INTRAMUSCULAR; INTRAVENOUS EVERY 4 HOURS PRN
Status: DISCONTINUED | OUTPATIENT
Start: 2023-09-07 | End: 2023-09-08 | Stop reason: HOSPADM

## 2023-09-07 RX ORDER — ACETAMINOPHEN 500 MG
500 TABLET ORAL EVERY 6 HOURS PRN
Qty: 30 TABLET | Refills: 0 | Status: SHIPPED | OUTPATIENT
Start: 2023-09-07

## 2023-09-07 RX ORDER — ONDANSETRON 4 MG/1
4 TABLET, FILM COATED ORAL EVERY 6 HOURS PRN
Status: DISCONTINUED | OUTPATIENT
Start: 2023-09-07 | End: 2023-09-08 | Stop reason: HOSPADM

## 2023-09-07 RX ORDER — SODIUM CHLORIDE 0.9 % (FLUSH) 0.9 %
10 SYRINGE (ML) INJECTION AS NEEDED
Status: DISCONTINUED | OUTPATIENT
Start: 2023-09-07 | End: 2023-09-08 | Stop reason: HOSPADM

## 2023-09-07 RX ORDER — SODIUM CHLORIDE 9 MG/ML
40 INJECTION, SOLUTION INTRAVENOUS AS NEEDED
Status: DISCONTINUED | OUTPATIENT
Start: 2023-09-07 | End: 2023-09-08 | Stop reason: HOSPADM

## 2023-09-07 RX ORDER — BISACODYL 10 MG
10 SUPPOSITORY, RECTAL RECTAL DAILY PRN
Status: CANCELLED | OUTPATIENT
Start: 2023-09-08

## 2023-09-07 RX ORDER — ONDANSETRON 2 MG/ML
4 INJECTION INTRAMUSCULAR; INTRAVENOUS EVERY 6 HOURS PRN
Status: CANCELLED | OUTPATIENT
Start: 2023-09-07

## 2023-09-07 RX ADMIN — MORPHINE SULFATE 2 MG: 2 INJECTION, SOLUTION INTRAMUSCULAR; INTRAVENOUS at 20:44

## 2023-09-07 RX ADMIN — SODIUM CHLORIDE, POTASSIUM CHLORIDE, SODIUM LACTATE AND CALCIUM CHLORIDE 125 ML/HR: 600; 310; 30; 20 INJECTION, SOLUTION INTRAVENOUS at 15:00

## 2023-09-07 RX ADMIN — MISOPROSTOL 400 MCG: 200 TABLET ORAL at 19:17

## 2023-09-07 RX ADMIN — MISOPROSTOL 800 MCG: 200 TABLET ORAL at 16:06

## 2023-09-07 NOTE — PROGRESS NOTES
CC: Prenatal visit    Daphne Joyce is a 33 y.o.  at 20w2d.  Doing well.  Denies contractions, LOF, or VB.  Reports she has not been feeling movements like she was.     Prelim US: Cephalic, SHANNON: 13.50, FHR absent, anterior placenta, no previa. BPD: <2.3%, HC: <2.3%, AC: <2.3%, FL: 12.7%, HL: 19.9%, EFW: 226g 8oz, <3%. BOY. CL: 4.29cm. rt and lt ovary normal.     Discussed with pt that there is no heart beat and this is considered a fetal demise. Pt had an appropriate tearful response. She is supported by her sister today.   Pt was given time to make calls to her  and other family in private.     /66   Wt 92.1 kg (203 lb)   LMP  (LMP Unknown)   BMI 35.96 kg/m²   SVE: na     Fetal Heart Rate: none    2023 Problems (from 23 to present)       Problem Noted Resolved    History of intrauterine growth restriction in prior pregnancy, currently pregnant in second trimester 2023 by Alecia Toscano DO No    Overview Signed 2023  3:25 PM by Alecia Toscano DO     Consider serial growth 3T         High-risk pregnancy in second trimester 2023 by Alecia Toscano DO No    Overview Signed 2023  3:26 PM by Alecia Toscano DO     Neg carrier screen  Low risk male         Obesity in pregnancy, antepartum 2023 by Alecia Toscano DO No    Overview Signed 2023  3:26 PM by Alecia Toscano DO     Early 1 hr GCT recommended         Mild tetrahydrocannabinol (THC) abuse 2023 by Susan Esposito, APRN No    Overview Signed 2023  1:21 PM by Susan Esposito APRN     +THC at nob Ed visit. Not enough urine for cannabinoid confirmation test. Needs urine specimen and this test ordered at first visit with provider.                  A/P: Daphne Joyce is a 33 y.o.  at 20w2d.  - Pt was admitted to L&D for delivery.   - Discussed with pt process of IOL.      Diagnosis Plan   1. Fetal demise before 20 weeks with retention of dead fetus        2. Mild tetrahydrocannabinol (THC)  abuse        3. History of intrauterine growth restriction in prior pregnancy, currently pregnant in second trimester        4. High-risk pregnancy in second trimester        5. Obesity in pregnancy, antepartum          MANUEL Aldridge  9/7/2023  14:06 CDT

## 2023-09-07 NOTE — PLAN OF CARE
Problem: Adult Inpatient Plan of Care  Goal: Plan of Care Review  Outcome: Ongoing, Progressing  Flowsheets (Taken 9/7/2023 1632)  Progress: improving  Plan of Care Reviewed With: patient  Outcome Evaluation: vss, cytotec 800 mcg given vaginally, having baby aliza Joyce.  Goal: Patient-Specific Goal (Individualized)  Outcome: Ongoing, Progressing  Goal: Absence of Hospital-Acquired Illness or Injury  Outcome: Ongoing, Progressing  Goal: Optimal Comfort and Wellbeing  Outcome: Ongoing, Progressing  Goal: Readiness for Transition of Care  Outcome: Ongoing, Progressing     Problem: Bleeding (Labor)  Goal: Hemostasis  Outcome: Ongoing, Progressing     Problem: Change in Fetal Wellbeing (Labor)  Goal: Stable Fetal Wellbeing  Outcome: Ongoing, Progressing     Problem: Delayed Labor Progression (Labor)  Goal: Effective Progression to Delivery  Outcome: Ongoing, Progressing     Problem: Infection (Labor)  Goal: Absence of Infection Signs and Symptoms  Outcome: Ongoing, Progressing     Problem: Labor Pain (Labor)  Goal: Acceptable Pain Control  Outcome: Ongoing, Progressing     Problem: Uterine Tachysystole (Labor)  Goal: Normal Uterine Contraction Pattern  Outcome: Ongoing, Progressing   Goal Outcome Evaluation:  Plan of Care Reviewed With: patient        Progress: improving  Outcome Evaluation: vss, cytotec 800 mcg given vaginally, having baby aliza Joyce.

## 2023-09-07 NOTE — H&P
[facility]  HISTORY & PHYSICAL - Obstetrics    Name: Daphne Joyce  MRN: [id number]  Location: L772/1  Date: 2023   CSN: [id number]      CHIEF COMPLAINT:  Fetal demise    HISTORY OF PRESENT ILLNESS  Daphne Joyce is a 33 y.o.  at 20w2d who presents with fetal demise noted today in clinic at her anatomy US.  Patient denies any bleeding, contractions, or LOF.  She states that she has not felt FM for several days like normal.    She states that she does plan to cremate her baby, Kristopher Joyce.    She has a history of IUGR with intermittent absent end-diastolic flow; she was delivered at 37 weeks.    ROS  Review of Systems   Constitutional: Negative.    HENT: Negative.     Eyes: Negative.    Respiratory: Negative.     Cardiovascular: Negative.    Gastrointestinal: Negative.    Endocrine: Negative.    Genitourinary: Negative.    Musculoskeletal: Negative.    Skin: Negative.    Allergic/Immunologic: Negative.    Neurological: Negative.    Hematological: Negative.    Psychiatric/Behavioral: Negative.       PRENATAL LAB RESULTS  Prenatal labs reviewed  External Prenatal Results       Pregnancy Outside Results - Transcribed From Office Records - See Scanned Records For Details       Test Value Date Time    ABO  A  23 1135    Rh  Positive  23 1135    Antibody Screen  Negative  23 1135    Varicella IgG       Rubella  5.91 index 23 1135    Hgb  14.5 g/dL 23 1135    Hct  42.9 % 23 1135    Glucose Fasting GTT       Glucose Tolerance Test 1 hour       Glucose Tolerance Test 3 hour       Gonorrhea (discrete)  Negative  23 1135    Chlamydia (discrete)  Negative  23 1135    RPR  Non-Reactive  23 1135    VDRL       Syphilis Antibody       HBsAg  Non-Reactive  23 1135    Herpes Simplex Virus PCR       Herpes Simplex VIrus Culture       HIV  Non-Reactive  23 1135    Hep C RNA Quant PCR ^ NONREACTIVE  01/15/20 1652    Hep  C Antibody  Non-Reactive  23 1135    AFP       Group B Strep       GBS Susceptibility to Clindamycin       GBS Susceptibility to Erythromycin       Fetal Fibronectin       Genetic Testing, Maternal Blood                 Drug Screening       Test Value Date Time    Urine Drug Screen       Amphetamine Screen  Negative  23 1135    Barbiturate Screen  Negative  23 1135    Benzodiazepine Screen  Negative  23 1135    Methadone Screen  Negative  23 1135    Phencyclidine Screen  Negative  23 1135    Opiates Screen  Negative  23 1135    THC Screen  Positive  23 1135    Cocaine Screen       Propoxyphene Screen  Negative  23 1135    Buprenorphine Screen  Negative  23 1135    Methamphetamine Screen       Oxycodone Screen  Negative  23 1135    Tricyclic Antidepressants Screen  Negative  23 1135              Legend    ^: Historical                          PRENATAL RISK FACTORS  2023 Problems (from 23 to present)       Problem Noted Resolved    History of intrauterine growth restriction in prior pregnancy, currently pregnant in second trimester 2023 by Alecia Toscano DO No    Overview Signed 2023  3:25 PM by Alecia Toscano DO     Consider serial growth 3T         High-risk pregnancy in second trimester 2023 by Alecia Toscano DO No    Overview Signed 2023  3:26 PM by Alecia Toscano DO     Neg carrier screen  Low risk male         Obesity in pregnancy, antepartum 2023 by Alecia Toscano DO No    Overview Signed 2023  3:26 PM by Alecia Toscano DO     Early 1 hr GCT recommended         Mild tetrahydrocannabinol (THC) abuse 2023 by Susan Esposito APRN No    Overview Signed 2023  1:21 PM by Susan Esposito APRN     +THC at nob Ed visit. Not enough urine for cannabinoid confirmation test. Needs urine specimen and this test ordered at first visit with provider.                OB HISTORY  OB History    Para Term   AB Living   3 2 2     1   SAB IAB Ectopic Molar Multiple Live Births             1      # Outcome Date GA Lbr Mauricio/2nd Weight Sex Delivery Anes PTL Lv   3 Current            2 Term 20 39w1d / 00:44 2925 g (6 lb 7.2 oz) F Vag-Spont EPI N IRLANDA   1 Term 16 37w2d  2183 g (4 lb 13 oz) M Vag-Spont EPI  IRLANDA      Birth Comments: admitted several times for absent end-diastolic flow. betamethasone given. had abnormal quad screen but normal Materni-T21      Complications: IUGR (intrauterine growth restriction) affecting care of mother, History of marijuana use     PAST MEDICAL HISTORY  Past Medical History:   Diagnosis Date    Anxiety     Bipolar disorder     History of transfusion     As a  baby    Migraine     Mild tetrahydrocannabinol (THC) abuse     uses CBD, hemp    Mild tetrahydrocannabinol (THC) abuse 2023    Polycystic ovary syndrome 2012    Reactive airway disease     Scoliosis     Tobacco abuse     electronic cigarette     PAST SURGICAL HISTORY  Past Surgical History:   Procedure Laterality Date    LAPAROSCOPIC CHOLECYSTECTOMY  2012    WISDOM TOOTH EXTRACTION      All 4     FAMILY HISTORY  Family History   Problem Relation Age of Onset    Hypertension Mother     Diabetes Father     Hypertension Father     Epilepsy Sister     Heart disease Maternal Grandmother     Hypertension Maternal Grandmother     Liver disease Maternal Grandfather     Cancer Paternal Grandmother     Colon cancer Neg Hx     Uterine cancer Neg Hx     Breast cancer Neg Hx     Ovarian cancer Neg Hx      SOCIAL HISTORY  Social History     Socioeconomic History    Marital status:      Spouse name: hilton    Number of children: 3    Years of education: 13 years    Highest education level: High school graduate   Tobacco Use    Smoking status: Former     Packs/day: 0.00     Years: 1.00     Pack years: 0.00     Types: Cigarettes, Electronic Cigarette     Quit date: 2023     Years since quittin.2     Smokeless tobacco: Never   Vaping Use    Vaping Use: Former    Quit date: 2023    Substances: Nicotine, THC, CBD, Flavoring    Devices: Disposable   Substance and Sexual Activity    Alcohol use: Not Currently    Drug use: Yes     Types: Marijuana, Other     Comment: YESTERDAY VAPE PEN    Sexual activity: Yes     Partners: Male     Birth control/protection: None     Comment: My  of 7 years     ALLERGIES  No Known Allergies    HOME MEDICATIONS  Prior to Admission medications    Medication Sig Start Date End Date Taking? Authorizing Provider   doxylamine (UNISOM) 25 MG tablet Take 1 tablet by mouth every night at bedtime. 23   Samaria Avila APRN   ondansetron (Zofran) 8 MG tablet Take 1 tablet by mouth Every 12 (Twelve) Hours As Needed for Nausea or Vomiting. 23   Samaria Avila APRN   sertraline (Zoloft) 100 MG tablet Take 1 tablet by mouth Daily.  Patient taking differently: Take 0.5 tablets by mouth Daily. 23   Amber Ruiz APRN   vitamin B-6 (PYRIDOXINE) 50 MG tablet Take 1 tablet by mouth every night at bedtime. 23   Samaria Avila APRN       PHYSICAL EXAM  /69 (BP Location: Right arm, Patient Position: Sitting)   Pulse 85   Temp 98.3 °F (36.8 °C) (Oral)   Resp 18   LMP  (LMP Unknown)   SpO2 98%   General: No acute distress. Well developed, well nourished. Pleasant.  Heart: Regular rate and rhythm. No murmurs, rubs, or gallops  Lungs: Clear to auscultation bilaterally. No wheezes, rales, or rhonchi.  Abdomen: Soft, nontender to palpation, enlarged by gravid uterus.    SVE: closed/ thick/ high/ soft/ posterior  800 mcg vaginal Cytotec placed at 4:15PM by myself    IMPRESSION  Daphne Joyce is a 33 y.o.  at 20w2d admitted with fetal demise, measuring at 18w2d.  Will start induction with Cytotec.  Discussed time frame and also discussed risks of retained placenta given her early gestation.     PLAN  1.  Fetal demise  -  Admit: Labor and Delivery  - Attending: Dr. Nation  - Condition: Stable  - Vitals: per protocol  - Activity: ad kendra  - Nursing: None  - Diet: Regular; NPO after delivery of fetus until placenta is delivered  - IV fluids:  mL/hr  - Meds: Cytotec  - Allergies: NKDA  - Labs: CBC, T&S, UDS, lupus anticoagulant panel (especially since history of IUGR w/ intermittent AEDF), TSH, HbA1c, K-B stain  - Emotional support provided  - Declines autopsy  - Declines genetics  - Plans for cremation of body  - Daphne Joyce and JACKIE have discussed pain goals for this hospitalization after reviewing her current clinical condition, medical history and prior pain experiences.  The goal is to keep her pain level appropriate.  Patient may have epidural if desired.  - Anticipate     This document has been electronically signed by Hillary Nation MD on 2023 16:46 CDT.

## 2023-09-08 VITALS
SYSTOLIC BLOOD PRESSURE: 108 MMHG | DIASTOLIC BLOOD PRESSURE: 61 MMHG | RESPIRATION RATE: 18 BRPM | TEMPERATURE: 98.8 F | OXYGEN SATURATION: 97 % | HEART RATE: 80 BPM

## 2023-09-08 NOTE — L&D DELIVERY NOTE
[facility]  Vaginal Delivery Note    Patient Name: Daphne Joyce  : 1990  MRN: [id number]  Date of Delivery: 2023     Diagnosis     Pre & Post-Delivery:  Intrauterine pregnancy at 20w2d  Labor status: Induced Onset of Labor     Fetal demise before 20 weeks with retention of dead fetus    Mild tetrahydrocannabinol (THC) abuse    History of intrauterine growth restriction in prior pregnancy, currently pregnant in second trimester    High-risk pregnancy in second trimester    Obesity in pregnancy, antepartum             Problem List    Transfer to Postpartum     Review the Delivery Report for details.     Delivery     Delivery: Vaginal, Spontaneous     YOB: 2021    Time of Birth:  Gestational Age 10:35 PM  20w2d     Anesthesia: None     Delivering clinician: Hillary Nation    Forceps?   No   Vacuum? No    Shoulder dystocia present: No        Delivery narrative:    Patient presented today at 20w2d with fetal demise measuring at 18w2d.  She received a dose of vaginal Cytotec then a dose of sublingual Cytotec 3 hours later.  When she was due for her next dose, she began to have worsening pain.  I was notified at 10:30PM that the baby was about to deliver.  When I arrived to the hospital at 10:45PM, the baby had delivered and was in the arms of his mother.  The patient had delivered a non-viable male  over an intact perineum via  without anesthesia on 2023 at 10:35 PM.  The umbilical cord had been clamped and cut.  I then evaluated the patient and found the placenta to be in the vagina.  With gentle coaxing and traction, the placenta was delivered intact.  Cervix, vagina, and perineum were examined and no laceration was noted.  EBL 100mL; QBL pending, please see nursing documentation.  Apgar scores 0/0.  Mother in stable condition.    Upon evaluation of the fetus, no evidence of abnormality noted.  Umbilical cord was noted to have true  knot x1.  Placenta appeared normal.  Amniotic fluid was brown per nursing staff.      Infant     Findings: child  infant     Infant observations: Weight: No birth weight on file.   Length:   in  Observations/Comments:        Apgars: 0  @ 1 minute /    0  @ 5 minutes   Infant Name: Kristopher Ulrich Maria Del Carmen     Placenta & Cord         Placenta delivered  Spontaneous  at        Cord: 3 vessels  present.   Nuchal Cord?  no  True knot x1   Cord blood obtained: No    Cord gases obtained:  No    Cord gas results: Venous:  No results found for: PHCVEN    Arterial:  No results found for: PHCART     Repair     Episiotomy: None    No    Lacerations: No   Estimated Blood Loss: 100 mL     Quantitative Blood Loss:          Complications     Fetal demise at 20w2d    Disposition     Mother in stable condition currently.  After at least 2 hours of monitoring, if continued stable condition, then will discharge to home per patient preference.    Hillary Nation MD  09/07/23  23:12 CDT

## 2023-09-08 NOTE — PAYOR COMM NOTE
"[facility]  Case Managment Extender   Pattie Sorensen  (P) [phone]  (F) [phone]            [employer] Provider ID# [id number]  HoneyDaphne (33 y.o. Female)       Date of Birth   1990    Social Security Number       Address   [address] [place] [address]    Home Phone   [phone]    MRN   [id number]       Mosque   Nondenominational    Marital Status                               Admission Date   9/7/23    Admission Type   Elective    Admitting Provider   Hillary Nation MD    Attending Provider       Department, Room/Bed   [facility] LABOR DELIVERY, L772/1       Discharge Date   9/8/2023    Discharge Disposition   Home or Self Care    Discharge Destination                                 Attending Provider: (none)   Allergies: No Known Allergies    Isolation: None   Infection: None   Code Status: Prior    Ht: 160 cm (63\")   Wt: 92.1 kg (203 lb)    Admission Cmt: None   Principal Problem: Fetal demise before 20 weeks with retention of dead fetus [O02.1]                   Active Insurance as of 9/7/2023       Primary Coverage       Payor Plan Insurance Group Employer/Plan Group    [employer] [place] [employer] [place]ICA[place]        Payor Plan Address Payor Plan Phone Number Payor Plan Fax Number Effective Dates    [address] [address] [phone]  1/11/2022 - None Entered    [facility] [id number]         Subscriber Name Subscriber Birth Date Member ID       DAPHNE JOYCE 1990 [id number]                     Emergency Contacts        (Rel.) Home Phone Work Phone Mobile Phone    HONEYFINN (Spouse) -- -- [phone]                 History & Physical        Hillary Nation MD at 09/07/23 1540          [facility]  HISTORY & PHYSICAL - Obstetrics    Name: Daphne Joyce  MRN: [id number]  Location: L772/1  Date: 9/7/2023   CSN: [id number]      CHIEF COMPLAINT:  Fetal " demise    HISTORY OF PRESENT ILLNESS  Daphne Joyce is a 33 y.o.  at 20w2d who presents with fetal demise noted today in clinic at her anatomy US.  Patient denies any bleeding, contractions, or LOF.  She states that she has not felt FM for several days like normal.    She states that she does plan to cremate her baby, Kristopher Joyce.    She has a history of IUGR with intermittent absent end-diastolic flow; she was delivered at 37 weeks.    ROS  Review of Systems   Constitutional: Negative.    HENT: Negative.     Eyes: Negative.    Respiratory: Negative.     Cardiovascular: Negative.    Gastrointestinal: Negative.    Endocrine: Negative.    Genitourinary: Negative.    Musculoskeletal: Negative.    Skin: Negative.    Allergic/Immunologic: Negative.    Neurological: Negative.    Hematological: Negative.    Psychiatric/Behavioral: Negative.       PRENATAL LAB RESULTS  Prenatal labs reviewed  External Prenatal Results       Pregnancy Outside Results - Transcribed From Office Records - See Scanned Records For Details       Test Value Date Time    ABO  A  23 1135    Rh  Positive  23 1135    Antibody Screen  Negative  23 1135    Varicella IgG       Rubella  5.91 index 23 1135    Hgb  14.5 g/dL 23 1135    Hct  42.9 % 23 1135    Glucose Fasting GTT       Glucose Tolerance Test 1 hour       Glucose Tolerance Test 3 hour       Gonorrhea (discrete)  Negative  23 1135    Chlamydia (discrete)  Negative  23 1135    RPR  Non-Reactive  23 1135    VDRL       Syphilis Antibody       HBsAg  Non-Reactive  23 1135    Herpes Simplex Virus PCR       Herpes Simplex VIrus Culture       HIV  Non-Reactive  23 1135    Hep C RNA Quant PCR ^ NONREACTIVE  01/15/20 1652    Hep C Antibody  Non-Reactive  23 1135    AFP       Group B Strep       GBS Susceptibility to Clindamycin       GBS Susceptibility to Erythromycin       Fetal Fibronectin       Genetic Testing,  Maternal Blood                 Drug Screening       Test Value Date Time    Urine Drug Screen       Amphetamine Screen  Negative  23 1135    Barbiturate Screen  Negative  23 1135    Benzodiazepine Screen  Negative  23 1135    Methadone Screen  Negative  23 1135    Phencyclidine Screen  Negative  23 1135    Opiates Screen  Negative  23 1135    THC Screen  Positive  23 1135    Cocaine Screen       Propoxyphene Screen  Negative  23 1135    Buprenorphine Screen  Negative  23 1135    Methamphetamine Screen       Oxycodone Screen  Negative  23 1135    Tricyclic Antidepressants Screen  Negative  23 1135              Legend    ^: Historical                          PRENATAL RISK FACTORS  2023 Problems (from 23 to present)       Problem Noted Resolved    History of intrauterine growth restriction in prior pregnancy, currently pregnant in second trimester 2023 by Alecia Toscano DO No    Overview Signed 2023  3:25 PM by Alecia Toscano DO     Consider serial growth 3T         High-risk pregnancy in second trimester 2023 by Alecia Toscano DO No    Overview Signed 2023  3:26 PM by Alecia Toscano DO     Neg carrier screen  Low risk male         Obesity in pregnancy, antepartum 2023 by Alecia Toscano DO No    Overview Signed 2023  3:26 PM by Alecia Toscano DO     Early 1 hr GCT recommended         Mild tetrahydrocannabinol (THC) abuse 2023 by Susan Esposito APRN No    Overview Signed 2023  1:21 PM by Susan Esposito APRN     +THC at nob Ed visit. Not enough urine for cannabinoid confirmation test. Needs urine specimen and this test ordered at first visit with provider.                OB HISTORY  OB History    Para Term  AB Living   3 2 2     1   SAB IAB Ectopic Molar Multiple Live Births             1      # Outcome Date GA Lbr Mauricio/2nd Weight Sex Delivery Anes PTL Lv   3 Current            2 Term  20 39w1d / 00:44 2925 g (6 lb 7.2 oz) F Vag-Spont EPI N IRLANDA   1 Term 16 37w2d  2183 g (4 lb 13 oz) M Vag-Spont EPI  IRLANDA      Birth Comments: admitted several times for absent end-diastolic flow. betamethasone given. had abnormal quad screen but normal Materni-T21      Complications: IUGR (intrauterine growth restriction) affecting care of mother, History of marijuana use     PAST MEDICAL HISTORY  Past Medical History:   Diagnosis Date    Anxiety     Bipolar disorder     History of transfusion     As a  baby    Migraine     Mild tetrahydrocannabinol (THC) abuse     uses CBD, hemp    Mild tetrahydrocannabinol (THC) abuse 2023    Polycystic ovary syndrome 2012    Reactive airway disease     Scoliosis     Tobacco abuse     electronic cigarette     PAST SURGICAL HISTORY  Past Surgical History:   Procedure Laterality Date    LAPAROSCOPIC CHOLECYSTECTOMY  2012    WISDOM TOOTH EXTRACTION  2017    All 4     FAMILY HISTORY  Family History   Problem Relation Age of Onset    Hypertension Mother     Diabetes Father     Hypertension Father     Epilepsy Sister     Heart disease Maternal Grandmother     Hypertension Maternal Grandmother     Liver disease Maternal Grandfather     Cancer Paternal Grandmother     Colon cancer Neg Hx     Uterine cancer Neg Hx     Breast cancer Neg Hx     Ovarian cancer Neg Hx      SOCIAL HISTORY  Social History     Socioeconomic History    Marital status:      Spouse name: hilton    Number of children: 3    Years of education: 13 years    Highest education level: High school graduate   Tobacco Use    Smoking status: Former     Packs/day: 0.00     Years: 1.00     Pack years: 0.00     Types: Cigarettes, Electronic Cigarette     Quit date: 2023     Years since quittin.2    Smokeless tobacco: Never   Vaping Use    Vaping Use: Former    Quit date: 2023    Substances: Nicotine, THC, CBD, Flavoring    Devices: Disposable   Substance and Sexual Activity     Alcohol use: Not Currently    Drug use: Yes     Types: Marijuana, Other     Comment: YESTERDAY VAPE PEN    Sexual activity: Yes     Partners: Male     Birth control/protection: None     Comment: My  of 7 years     ALLERGIES  No Known Allergies    HOME MEDICATIONS  Prior to Admission medications    Medication Sig Start Date End Date Taking? Authorizing Provider   doxylamine (UNISOM) 25 MG tablet Take 1 tablet by mouth every night at bedtime. 23   Samaria Avila APRN   ondansetron (Zofran) 8 MG tablet Take 1 tablet by mouth Every 12 (Twelve) Hours As Needed for Nausea or Vomiting. 23   Samaria Avila APRN   sertraline (Zoloft) 100 MG tablet Take 1 tablet by mouth Daily.  Patient taking differently: Take 0.5 tablets by mouth Daily. 23   Amber Ruiz APRN   vitamin B-6 (PYRIDOXINE) 50 MG tablet Take 1 tablet by mouth every night at bedtime. 23   Samaria Avila APRN       PHYSICAL EXAM  /69 (BP Location: Right arm, Patient Position: Sitting)   Pulse 85   Temp 98.3 °F (36.8 °C) (Oral)   Resp 18   LMP  (LMP Unknown)   SpO2 98%   General: No acute distress. Well developed, well nourished. Pleasant.  Heart: Regular rate and rhythm. No murmurs, rubs, or gallops  Lungs: Clear to auscultation bilaterally. No wheezes, rales, or rhonchi.  Abdomen: Soft, nontender to palpation, enlarged by gravid uterus.    SVE: closed/ thick/ high/ soft/ posterior  800 mcg vaginal Cytotec placed at 4:15PM by myself    IMPRESSION  Daphne Joyce is a 33 y.o.  at 20w2d admitted with fetal demise, measuring at 18w2d.  Will start induction with Cytotec.  Discussed time frame and also discussed risks of retained placenta given her early gestation.     PLAN  1.  Fetal demise  - Admit: Labor and Delivery  - Attending: Dr. Nation  - Condition: Stable  - Vitals: per protocol  - Activity: ad kendra  - Nursing: None  - Diet: Regular; NPO after delivery of fetus until  placenta is delivered  - IV fluids:  mL/hr  - Meds: Cytotec  - Allergies: NKDA  - Labs: CBC, T&S, UDS, lupus anticoagulant panel (especially since history of IUGR w/ intermittent AEDF), TSH, HbA1c, K-B stain  - Emotional support provided  - Declines autopsy  - Declines genetics  - Plans for cremation of body  - Daphne Joyce and I have discussed pain goals for this hospitalization after reviewing her current clinical condition, medical history and prior pain experiences.  The goal is to keep her pain level appropriate.  Patient may have epidural if desired.  - Anticipate     This document has been electronically signed by Hillary Nation MD on 2023 16:46 CDT.    Electronically signed by Hillary Nation MD at 23 1646       Emergency Department Notes    No notes of this type exist for this encounter.       Lab Results (last 24 hours)       Procedure Component Value Units Date/Time    Urine Drug Screen - [[id number]]  (Abnormal) Collected: 23 1741    Specimen: Urine Updated: 23 1833     THC, Screen, Urine Positive     Phencyclidine (PCP), Urine Negative     Cocaine Screen, Urine Negative     Methamphetamine, Ur Negative     Opiate Screen Negative     Amphetamine Screen, Urine Negative     Benzodiazepine Screen, Urine Negative     Tricyclic Antidepressants Screen Negative     Methadone Screen, Urine Negative     Barbiturates Screen, Urine Negative     Oxycodone Screen, Urine Negative     Propoxyphene Screen Negative     Buprenorphine, Screen, Urine Negative    Narrative:      Cutoff For Drugs Screened:    Amphetamines               500 ng/ml  Barbiturates               200 ng/ml  Benzodiazepines            150 ng/ml  Cocaine                    150 ng/ml  Methadone                  200 ng/ml  Opiates                    100 ng/ml  Phencyclidine               25 ng/ml  THC                            50 ng/ml  Methamphetamine            500 ng/ml  Tricyclic  Antidepressants  300 ng/ml  Oxycodone                  100 ng/ml  Propoxyphene               300 ng/ml  Buprenorphine               10 ng/ml    The normal value for all drugs tested is negative. This report includes unconfirmed screening results, with the cutoff values listed, to be used for medical treatment purposes only.  Unconfirmed results must not be used for non-medical purposes such as employment or legal testing.  Clinical consideration should be applied to any drug of abuse test, particularly when unconfirmed results are used.      Cannabinoids, Conf, MS, UR - [[id number]] Collected: 09/07/23 1741    Specimen: Urine Updated: 09/07/23 1833    Hemoglobin A1c [[id number]]  (Normal) Collected: 09/07/23 1518    Specimen: Blood Updated: 09/07/23 1830     Hemoglobin A1C 4.80 %     Narrative:      Hemoglobin A1C Ranges:    Increased Risk for Diabetes  5.7% to 6.4%  Diabetes                     >= 6.5%  Diabetic Goal                < 7.0%    Fentanyl, Urine - Urine, Clean Catch [[id number]]  (Normal) Collected: 09/07/23 1741    Specimen: Urine Updated: 09/07/23 1811     Fentanyl, Urine Negative    Narrative:      Negative Threshold:      Fentanyl 5 ng/mL     The normal value for the drug tested is negative. This report includes final unconfirmed screening results to be used for medical treatment purposes only. Unconfirmed results must not be used for non-medical purposes such as employment or legal testing. Clinical consideration should be applied to any drug of abuse test, particularly when unconfirmed results are used.           TSH [[id number]]  (Normal) Collected: 09/07/23 1518    Specimen: Blood Updated: 09/07/23 1650     TSH 1.130 uIU/mL     CBC (No Diff) [[id number]]  (Abnormal) Collected: 09/07/23 1518    Specimen: Blood Updated: 09/07/23 1541     WBC 13.51 10*3/mm3      RBC 4.62 10*6/mm3      Hemoglobin 13.8 g/dL      Hematocrit 40.8 %      MCV 88.3 fL      MCH 29.9 pg      MCHC 33.8 g/dL      RDW 13.6 %       RDW-SD 44.0 fl      MPV 9.1 fL      Platelets 311 10*3/mm3     Lupus Anticoagulant Panel [[id number]] Collected: 23 151    Specimen: Blood Updated: 23 1533          Imaging Results (Last 24 Hours)       ** No results found for the last 24 hours. **          ECG/EMG Results (last 24 hours)       ** No results found for the last 24 hours. **             Operative/Procedure Notes (last 24 hours)        Hillary Nation MD at 23 2312          [facility]  Vaginal Delivery Note    Patient Name: Daphne Joyce  : 1990  MRN: [id number]  Date of Delivery: 2023     Diagnosis     Pre & Post-Delivery:  Intrauterine pregnancy at 20w2d  Labor status: Induced Onset of Labor     Fetal demise before 20 weeks with retention of dead fetus    Mild tetrahydrocannabinol (THC) abuse    History of intrauterine growth restriction in prior pregnancy, currently pregnant in second trimester    High-risk pregnancy in second trimester    Obesity in pregnancy, antepartum             Problem List    Transfer to Postpartum     Review the Delivery Report for details.     Delivery     Delivery: Vaginal, Spontaneous     YOB: 2021    Time of Birth:  Gestational Age 10:35 PM  20w2d     Anesthesia: None     Delivering clinician: Hillary Nation    Forceps?   No   Vacuum? No    Shoulder dystocia present: No        Delivery narrative:    Patient presented today at 20w2d with fetal demise measuring at 18w2d.  She received a dose of vaginal Cytotec then a dose of sublingual Cytotec 3 hours later.  When she was due for her next dose, she began to have worsening pain.  I was notified at 10:30PM that the baby was about to deliver.  When I arrived to the hospital at 10:45PM, the baby had delivered and was in the arms of his mother.  The patient had delivered a non-viable male  over an intact perineum via  without anesthesia on 2023 at 10:35 PM.  The  umbilical cord had been clamped and cut.  I then evaluated the patient and found the placenta to be in the vagina.  With gentle coaxing and traction, the placenta was delivered intact.  Cervix, vagina, and perineum were examined and no laceration was noted.  EBL 100mL; QBL pending, please see nursing documentation.  Apgar scores 0/0.  Mother in stable condition.    Upon evaluation of the fetus, no evidence of abnormality noted.  Umbilical cord was noted to have true knot x1.  Placenta appeared normal.  Amniotic fluid was brown per nursing staff.      Infant     Findings: child  infant     Infant observations: Weight: No birth weight on file.   Length:   in  Observations/Comments:        Apgars: 0  @ 1 minute /    0  @ 5 minutes   Infant Name: Kristopher Haropin     Placenta & Cord         Placenta delivered  Spontaneous  at        Cord: 3 vessels  present.   Nuchal Cord?  no  True knot x1   Cord blood obtained: No    Cord gases obtained:  No    Cord gas results: Venous:  No results found for: PHCVEN    Arterial:  No results found for: PHCART     Repair     Episiotomy: None    No    Lacerations: No   Estimated Blood Loss: 100 mL     Quantitative Blood Loss:          Complications     Fetal demise at 20w2d    Disposition     Mother in stable condition currently.  After at least 2 hours of monitoring, if continued stable condition, then will discharge to home per patient preference.    Hillary Nation MD  23  23:12 CDT    Electronically signed by Hillary Nation MD at 23 6095       Physician Progress Notes (last 24 hours)  Notes from 23 1119 through 23 1119   No notes of this type exist for this encounter.       Medical Student Notes (last 24 hours)  Notes from 23 1119 through 23 1119   No notes of this type exist for this encounter.       Consult Notes (last 24 hours)  Notes from 23 1120 through 23 1120   No notes of this type exist for this encounter.

## 2023-09-08 NOTE — DISCHARGE SUMMARY
[facility]  Discharge Summary  Patient Name: Daphne Joyce  : 1990  MRN: [id number]  CSN: [id number]    Discharge Summary    Date of Admission: 2023   Date of Discharge: 2023    Principle Discharge Dx: Active Hospital Problems    Diagnosis  POA    **Fetal demise before 20 weeks with retention of dead fetus [O02.1]  Yes    History of intrauterine growth restriction in prior pregnancy, currently pregnant in second trimester [O09.292]  Not Applicable     Consider serial growth 3T      High-risk pregnancy in second trimester [O09.92]  Not Applicable     Neg carrier screen  Low risk male      Obesity in pregnancy, antepartum [O99.210]  Yes     Early 1 hr GCT recommended      Mild tetrahydrocannabinol (THC) abuse [F12.10]  Yes     +THC at nob Ed visit. Not enough urine for cannabinoid confirmation test. Needs urine specimen and this test ordered at first visit with provider.         Procedures Performed:    Brief History: Patient is a 33 y.o. now  who presented to labor and delivery at 20w2d with fetal demise measuring 18w2d.   Hospital Course: Patient presented at 20w2d with fetal demise measuring 18w2d.  She received 1 dose of vaginal Cytotec then a sublingual Cytotec.  When she was due for her next dose, she had worsening pain.  She delivered the baby.  The umbilical cord was clamped and cut.  Her placenta delivered intact.  She was monitored for a couple of hours postpartum.  On PPD #1 she expressed the desire for discharge.  She had passed gas and was urinating normally.  She was eating a regular diet without difficulty.  She was ambulating well.  Discharge instructions were given.  All questions were answered   Condition:  Discharge Activity: Stable  Activity Instructions       Bathing Restrictions      Type of Restriction: Bathing    Bathing Restrictions: Other    Explain Bathing Restrictions: No soaking in bathtub for 4 weeks. Showers are fine.    Driving  Restrictions      Type of Restriction: Driving    Driving Restrictions: No Driving (Time Limited)    Length: Other    Indicate Length of Restriction: No driving for 1 week or if using narcotic pain medications. Riding is car is fine.    Lifting Restrictions      Type of Restriction: Lifting    Lifting Restrictions: Other    Explain Lifing Restrictions: No lifting more than infant and baby carrier together for 6 weeks.    Pelvic Rest      Nothing in the vagina for 6 weeks to include tampons, intercourse, or douching.    Sexual Activity Restrictions      Type of Restriction: Sex    Explain Sexual Activity Restrictions: No sexual intercourse for at least 6 weeks           Discharge Diet: Diet Instructions       Diet: Regular/House Diet      Discharge Diet: Regular/House Diet    Texture: Regular Texture (IDDSI 7)    Fluid Consistency: Thin (IDDSI 0)           Discharge Medications:    Your medication list        START taking these medications        Instructions Last Dose Given Next Dose Due   acetaminophen 500 MG tablet  Commonly known as: TYLENOL      Take 1 tablet by mouth Every 6 (Six) Hours As Needed for Mild Pain.       ibuprofen 800 MG tablet  Commonly known as: ADVIL,MOTRIN      Take 1 tablet by mouth Every 8 (Eight) Hours As Needed for Mild Pain.              CHANGE how you take these medications        Instructions Last Dose Given Next Dose Due   sertraline 100 MG tablet  Commonly known as: Zoloft  What changed: how much to take      Take 1 tablet by mouth Daily.              CONTINUE taking these medications        Instructions Last Dose Given Next Dose Due   doxylamine 25 MG tablet  Commonly known as: UNISOM      Take 1 tablet by mouth every night at bedtime.       ondansetron 8 MG tablet  Commonly known as: Zofran      Take 1 tablet by mouth Every 12 (Twelve) Hours As Needed for Nausea or Vomiting.       vitamin B-6 50 MG tablet  Commonly known as: PYRIDOXINE      Take 1 tablet by mouth every night at  bedtime.                 Where to Get Your Medications        These medications were sent to [id number]  KRISTIE PEREZ - [address] - [phone] PH - [phone]   [address], [place] [address]      Phone: [phone]   acetaminophen 500 MG tablet  ibuprofen 800 MG tablet        Discharge Disposition: Home   Follow-up: No future appointments.  2 week PP visit (telephone)  6 week PP visit     <30 minutes was spent with the patient on the day of discharge.    This document has been electronically signed by Hillary Nation MD on September 7, 2023 23:09 CDT.

## 2023-09-11 NOTE — PAYOR COMM NOTE
"Laurie Greymore  [facility]  Case Management Extender  [phone] phone  [phone] fax      Auth# [id number]     Daphne Joyce (33 y.o. Female)       Date of Birth   1990    Social Security Number       Address   [address] [place] [address]    Home Phone   [phone]    MRN   [id number]       Gnosticist   Synagogue    Marital Status                               Admission Date   23    Admission Type   Elective    Admitting Provider   Hillary Nation MD    Attending Provider       Department, Room/Bed   [facility] LABOR DELIVERY, L772/1       Discharge Date   2023    Discharge Disposition   Home or Self Care    Discharge Destination                                 Attending Provider: (none)   Allergies: No Known Allergies    Isolation: None   Infection: None   Code Status: Prior    Ht: 160 cm (63\")   Wt: 92.1 kg (203 lb)    Admission Cmt: None   Principal Problem: Fetal demise before 20 weeks with retention of dead fetus [O02.1]                   Active Insurance as of 2023       Primary Coverage       Payor Plan Insurance Group Employer/Plan Group    [employer] [place] [employer] [place]ICA[place]        Payor Plan Address Payor Plan Phone Number Payor Plan Fax Number Effective Dates    [address] [address] [phone]  2022 - None Entered    [place] [address]         Subscriber Name Subscriber Birth Date Member ID       DAPHNE JOYCE 1990 [id number]                     Emergency Contacts        (Rel.) Home Phone Work Phone Mobile Phone    HONEY FINN (Spouse) -- -- [phone]                 Discharge Summary        Hillary Nation MD at 23 2309          [facility]  Discharge Summary  Patient Name: Daphne Joyce  : 1990  MRN: [id number]  CSN: [id number]    Discharge Summary    Date of Admission: 2023   Date of Discharge: " 2023    Principle Discharge Dx: Active Hospital Problems    Diagnosis  POA    **Fetal demise before 20 weeks with retention of dead fetus [O02.1]  Yes    History of intrauterine growth restriction in prior pregnancy, currently pregnant in second trimester [O09.292]  Not Applicable     Consider serial growth 3T      High-risk pregnancy in second trimester [O09.92]  Not Applicable     Neg carrier screen  Low risk male      Obesity in pregnancy, antepartum [O99.210]  Yes     Early 1 hr GCT recommended      Mild tetrahydrocannabinol (THC) abuse [F12.10]  Yes     +THC at nob Ed visit. Not enough urine for cannabinoid confirmation test. Needs urine specimen and this test ordered at first visit with provider.         Procedures Performed:    Brief History: Patient is a 33 y.o. now  who presented to labor and delivery at 20w2d with fetal demise measuring 18w2d.   Hospital Course: Patient presented at 20w2d with fetal demise measuring 18w2d.  She received 1 dose of vaginal Cytotec then a sublingual Cytotec.  When she was due for her next dose, she had worsening pain.  She delivered the baby.  The umbilical cord was clamped and cut.  Her placenta delivered intact.  She was monitored for a couple of hours postpartum.  On PPD #1 she expressed the desire for discharge.  She had passed gas and was urinating normally.  She was eating a regular diet without difficulty.  She was ambulating well.  Discharge instructions were given.  All questions were answered   Condition:  Discharge Activity: Stable  Activity Instructions       Bathing Restrictions      Type of Restriction: Bathing    Bathing Restrictions: Other    Explain Bathing Restrictions: No soaking in bathtub for 4 weeks. Showers are fine.    Driving Restrictions      Type of Restriction: Driving    Driving Restrictions: No Driving (Time Limited)    Length: Other    Indicate Length of Restriction: No driving for 1 week or if using narcotic pain medications.  Riding is car is fine.    Lifting Restrictions      Type of Restriction: Lifting    Lifting Restrictions: Other    Explain Lifing Restrictions: No lifting more than infant and baby carrier together for 6 weeks.    Pelvic Rest      Nothing in the vagina for 6 weeks to include tampons, intercourse, or douching.    Sexual Activity Restrictions      Type of Restriction: Sex    Explain Sexual Activity Restrictions: No sexual intercourse for at least 6 weeks           Discharge Diet: Diet Instructions       Diet: Regular/House Diet      Discharge Diet: Regular/House Diet    Texture: Regular Texture (IDDSI 7)    Fluid Consistency: Thin (IDDSI 0)           Discharge Medications:    Your medication list        START taking these medications        Instructions Last Dose Given Next Dose Due   acetaminophen 500 MG tablet  Commonly known as: TYLENOL      Take 1 tablet by mouth Every 6 (Six) Hours As Needed for Mild Pain.       ibuprofen 800 MG tablet  Commonly known as: ADVIL,MOTRIN      Take 1 tablet by mouth Every 8 (Eight) Hours As Needed for Mild Pain.              CHANGE how you take these medications        Instructions Last Dose Given Next Dose Due   sertraline 100 MG tablet  Commonly known as: Zoloft  What changed: how much to take      Take 1 tablet by mouth Daily.              CONTINUE taking these medications        Instructions Last Dose Given Next Dose Due   doxylamine 25 MG tablet  Commonly known as: UNISOM      Take 1 tablet by mouth every night at bedtime.       ondansetron 8 MG tablet  Commonly known as: Zofran      Take 1 tablet by mouth Every 12 (Twelve) Hours As Needed for Nausea or Vomiting.       vitamin B-6 50 MG tablet  Commonly known as: PYRIDOXINE      Take 1 tablet by mouth every night at bedtime.                 Where to Get Your Medications        These medications were sent to [facility] [address] [place] [address], [place] - [address] [facility] [phone] [facility] [phone]   [address] [facility],  [place] [address]      Phone: [phone]   acetaminophen 500 MG tablet  ibuprofen 800 MG tablet        Discharge Disposition: Home   Follow-up: No future appointments.  2 week PP visit (telephone)  6 week PP visit     <30 minutes was spent with the patient on the day of discharge.    This document has been electronically signed by Jose Garcia MD on September 7, 2023 23:09 CDT.    Electronically signed by Jose Garcia MD at 09/07/23 2311       Discharge Order (From admission, onward)       Start     Ordered    09/07/23 2309  Discharge patient  Once        Expected Discharge Date: 09/08/23   Expected Discharge Time: Morning   Discharge Disposition: Home or Self Care   Physician of Record for Attribution - Please select from Treatment Team: JOSE GARCIA [[id number]]   Review needed by CMO to determine Physician of Record: No      Question Answer Comment   Physician of Record for Attribution - Please select from Treatment Team JOSE GARCIA    Review needed by CMO to determine Physician of Record No        09/07/23 9750

## 2023-09-12 LAB — REF LAB TEST METHOD: NORMAL

## 2023-09-15 LAB
CANNABINOIDS UR QL CFM: NORMAL
CARBOXYTHC/CREAT UR: 459 NG/MG CREAT
LEVEL OF DETECTION:: NORMAL

## 2023-09-18 LAB
APTT HEX PL PPP: 6 SEC
APTT IMM NP PPP: NORMAL SEC
APTT PPP 1:1 SALINE: NORMAL SEC
APTT PPP: 23.4 SEC
B2 GLYCOPROT1 IGA SER-ACNC: <10 SAU
B2 GLYCOPROT1 IGG SER-ACNC: <10 SGU
B2 GLYCOPROT1 IGM SER-ACNC: <10 SMU
CARDIOLIPIN IGG SER IA-ACNC: <10 GPL
CARDIOLIPIN IGM SER IA-ACNC: 10 MPL
CONFIRM APTT: 0.6 SEC
CONFIRM DRVVT: NORMAL SEC
DRVVT SCREEN TO CONFIRM RATIO: NORMAL RATIO
INR PPP: 1 RATIO
LABORATORY COMMENT REPORT: NORMAL
PROTHROMBIN TIME: 10.2 SEC
SCREEN DRVVT: 32.9 SEC
THROMBIN TIME: 15 SEC

## 2023-09-26 ENCOUNTER — CLINICAL SUPPORT (OUTPATIENT)
Dept: OBSTETRICS AND GYNECOLOGY | Facility: CLINIC | Age: 33
End: 2023-09-26
Payer: COMMERCIAL

## 2023-09-26 DIAGNOSIS — O02.1 FETAL DEMISE BEFORE 20 WEEKS WITH RETENTION OF DEAD FETUS: ICD-10-CM

## 2023-09-26 DIAGNOSIS — F41.9 ANXIETY: ICD-10-CM

## 2023-09-26 PROCEDURE — 0503F POSTPARTUM CARE VISIT: CPT | Performed by: OBSTETRICS & GYNECOLOGY

## 2023-09-26 RX ORDER — BUSPIRONE HYDROCHLORIDE 5 MG/1
5 TABLET ORAL 3 TIMES DAILY
Qty: 90 TABLET | Refills: 0 | Status: SHIPPED | OUTPATIENT
Start: 2023-09-26 | End: 2023-10-26

## 2023-09-26 NOTE — PROGRESS NOTES
[facility]  Obstetrics  Date of Service: 2023    CC: Postpartum visit      Daphne Joyce is a 33 y.o.  s/p Vaginal, Spontaneous on 2023 at 20w2d secondary to fetal demise who presents today for postpartum check.  The patient states she is doing okay.  Patient denies postpartum depression and feels that she is grieving appropriately.  She does feel that her medication does need to be increased.  She states that a family member recommended Buspar and she thinks that she may be interested in that or increasing her Zoloft.  She would like to try the Buspar first.  Menstrual cycles have not resumed.  She has not resumed sexual intercourse.  Denies bowel or bladder issues.  Leaving later this week to go to [place].    PHYSICAL EXAM:    LMP  (LMP Unknown)   Postpartum Depression Screening Questionnaire: 4    IMPRESSION/PLAN: Daphne Jyoce is a 33 y.o.  s/p Vaginal, Spontaneous on .  - Recovered nicely from her delivery  - Contraception: Undecided  - Will start Buspar.  Recommend scheduling an appointment in about 1 month with PCP to establish if this is helping.  If not, may consider increase in Zoloft dose.   - Needs pap smear  - Restrictions x4 weeks    This document has been electronically signed by Hillary Nation MD on 2023 16:10 CDT.    This visit has been rescheduled as a phone visit to comply with patient safety concerns in accordance with CDC recommendations.  Total time of discussion was 9 minutes.  The use of a telephone visit has been reviewed with the patient and verbal informed consent has been obtained.

## 2023-09-28 PROBLEM — O09.292 HISTORY OF INTRAUTERINE GROWTH RESTRICTION IN PRIOR PREGNANCY, CURRENTLY PREGNANT IN SECOND TRIMESTER: Status: RESOLVED | Noted: 2023-08-20 | Resolved: 2023-09-28

## 2023-09-28 PROBLEM — O09.92 HIGH-RISK PREGNANCY IN SECOND TRIMESTER: Status: RESOLVED | Noted: 2023-08-20 | Resolved: 2023-09-28

## 2023-09-28 PROBLEM — O99.210 OBESITY IN PREGNANCY, ANTEPARTUM: Status: RESOLVED | Noted: 2023-08-20 | Resolved: 2023-09-28

## 2023-09-28 PROBLEM — F12.10 MILD TETRAHYDROCANNABINOL (THC) ABUSE: Status: RESOLVED | Noted: 2023-06-06 | Resolved: 2023-09-28

## 2023-09-28 PROBLEM — O02.1 FETAL DEMISE BEFORE 20 WEEKS WITH RETENTION OF DEAD FETUS: Status: RESOLVED | Noted: 2023-09-07 | Resolved: 2023-09-28
